# Patient Record
Sex: MALE | Race: WHITE | NOT HISPANIC OR LATINO | Employment: FULL TIME | RURAL
[De-identification: names, ages, dates, MRNs, and addresses within clinical notes are randomized per-mention and may not be internally consistent; named-entity substitution may affect disease eponyms.]

---

## 2021-10-12 ENCOUNTER — OFFICE VISIT (OUTPATIENT)
Dept: FAMILY MEDICINE CLINIC | Facility: CLINIC | Age: 61
End: 2021-10-12

## 2021-10-12 DIAGNOSIS — Z02.89 ENCOUNTER FOR PHYSICAL EXAMINATION RELATED TO EMPLOYMENT: ICD-10-CM

## 2021-10-12 PROCEDURE — 81003 URINALYSIS AUTO W/O SCOPE: CPT | Performed by: INTERNAL MEDICINE

## 2021-10-12 PROCEDURE — 99173 VISUAL ACUITY SCREEN: CPT | Performed by: INTERNAL MEDICINE

## 2021-10-12 PROCEDURE — DOTPHY: Performed by: INTERNAL MEDICINE

## 2021-10-12 NOTE — PROGRESS NOTES
Employment physical performed today. See scanned note.    I approved pt for duty with the agreement that he would follow up with his eye physician. He reports that he has already called them about an appointment and plans to go for that soon.

## 2022-03-28 ENCOUNTER — OFFICE VISIT (OUTPATIENT)
Dept: UROLOGY | Facility: CLINIC | Age: 62
End: 2022-03-28

## 2022-03-28 ENCOUNTER — HOSPITAL ENCOUNTER (OUTPATIENT)
Dept: RESPIRATORY THERAPY | Facility: HOSPITAL | Age: 62
Discharge: HOME | End: 2022-03-28
Admitting: SURGERY

## 2022-03-28 ENCOUNTER — TRANSCRIBE ORDERS (OUTPATIENT)
Dept: ADMINISTRATIVE | Facility: HOSPITAL | Age: 62
End: 2022-03-28

## 2022-03-28 ENCOUNTER — PATIENT ROUNDING (BHMG ONLY) (OUTPATIENT)
Dept: UROLOGY | Facility: CLINIC | Age: 62
End: 2022-03-28

## 2022-03-28 VITALS
HEIGHT: 66 IN | WEIGHT: 175 LBS | SYSTOLIC BLOOD PRESSURE: 126 MMHG | BODY MASS INDEX: 28.12 KG/M2 | DIASTOLIC BLOOD PRESSURE: 92 MMHG | OXYGEN SATURATION: 98 % | HEART RATE: 60 BPM | TEMPERATURE: 97.5 F

## 2022-03-28 DIAGNOSIS — R97.20 ELEVATED PROSTATE SPECIFIC ANTIGEN (PSA): Primary | ICD-10-CM

## 2022-03-28 DIAGNOSIS — I10 HTN (HYPERTENSION), BENIGN: ICD-10-CM

## 2022-03-28 DIAGNOSIS — I10 HTN (HYPERTENSION), BENIGN: Primary | ICD-10-CM

## 2022-03-28 LAB
BILIRUB BLD-MCNC: NEGATIVE MG/DL
CLARITY, POC: CLEAR
COLOR UR: YELLOW
EXPIRATION DATE: ABNORMAL
GLUCOSE UR STRIP-MCNC: NEGATIVE MG/DL
KETONES UR QL: NEGATIVE
LEUKOCYTE EST, POC: NEGATIVE
Lab: ABNORMAL
NITRITE UR-MCNC: NEGATIVE MG/ML
PH UR: 6 [PH] (ref 5–8)
PROT UR STRIP-MCNC: ABNORMAL MG/DL
QT INTERVAL: 404 MS
QTC INTERVAL: 406 MS
RBC # UR STRIP: ABNORMAL /UL
SP GR UR: 1.03 (ref 1–1.03)
UROBILINOGEN UR QL: NORMAL

## 2022-03-28 PROCEDURE — 93005 ELECTROCARDIOGRAM TRACING: CPT | Performed by: SURGERY

## 2022-03-28 PROCEDURE — 81003 URINALYSIS AUTO W/O SCOPE: CPT | Performed by: PHYSICIAN ASSISTANT

## 2022-03-28 PROCEDURE — 93010 ELECTROCARDIOGRAM REPORT: CPT | Performed by: INTERNAL MEDICINE

## 2022-03-28 PROCEDURE — 99203 OFFICE O/P NEW LOW 30 MIN: CPT | Performed by: PHYSICIAN ASSISTANT

## 2022-03-28 RX ORDER — METOPROLOL SUCCINATE 25 MG/1
25 TABLET, EXTENDED RELEASE ORAL DAILY
COMMUNITY
Start: 2022-03-16

## 2022-03-28 RX ORDER — ROSUVASTATIN CALCIUM 20 MG/1
20 TABLET, COATED ORAL DAILY
COMMUNITY
Start: 2022-02-17

## 2022-03-28 RX ORDER — CHOLECALCIFEROL (VITAMIN D3) 1250 MCG
50000 CAPSULE ORAL WEEKLY
COMMUNITY
Start: 2022-02-23

## 2022-03-28 NOTE — PROGRESS NOTES
"Chief Complaint  Elevated PSA    Referring Provider  Connie Reyes, TYEHSA ORELLANA  Mr. Montanez is a 61 y.o.  male who presents with an elevated PSA to 4.4. He has never had a prostate exam. He previously saw a Urologist for hematuria with a negative workup (35 years ago).     He has a FH of prostate CA in his father, was diagnosed at 64yo. He is unsure of what treatments were needed.       Patient complains of daytime frequency (in the setting of coffee drinking).  His IPSS score is 2.     Patient denies fevers, chills, nausea, vomiting, constipation, or flank pain.  Past  history is negative for BPH, frequent UTIs, stones or other renal diseases.     He denies shortness of breath, leg swelling, calf pain or bone pain.    Past Medical History  Past Medical History:   Diagnosis Date   • Heart attack (HCC) 03/2016       Past Surgical History  History reviewed. No pertinent surgical history.    Medications    Current Outpatient Medications:   •  Cholecalciferol (Vitamin D3) 1.25 MG (97490 UT) capsule, Take 50,000 Units by mouth 1 (One) Time Per Week., Disp: , Rfl:   •  metoprolol succinate XL (TOPROL-XL) 25 MG 24 hr tablet, Take 25 mg by mouth Daily., Disp: , Rfl:   •  rosuvastatin (CRESTOR) 20 MG tablet, Take 20 mg by mouth Daily., Disp: , Rfl:     Allergies  No Known Allergies      Family History  Father with prostate CA      Physical Exam  Visit Vitals  /92 (BP Location: Left arm, Patient Position: Sitting, Cuff Size: Adult)   Pulse 60   Temp 97.5 °F (36.4 °C)   Ht 167.6 cm (66\")   Wt 79.4 kg (175 lb)   SpO2 98%   BMI 28.25 kg/m²       Genitourinary  Rectal:  Normal tone, no masses.  Prostate:  Enlarged.  Symmetric, non-tender, anodular and no induration.      Labs  Brief Urine Lab Results  (Last result in the past 365 days)      Color   Clarity   Blood   Leuk Est   Nitrite   Protein   CREAT   Urine HCG        03/28/22 1501 Yellow   Clear   Trace   Negative   Negative   Trace           "         Assessment  Mr. Montanez is a 61 y.o.  male who presents with elevated PSA.  This is a new diagnosis of uncertain clinical prognosis.    I explained to the patient that an elevated PSA is a marker of risk of prostate cancer and a prostate biopsy would likely be the next step. We will obtain repeat PSA with free/total ratios and discuss results.    Plan  1. PSA total:free obtained today  2. FU in 2-4 weeks by phone to discuss results

## 2022-04-01 NOTE — PROGRESS NOTES
April 1, 2022    Hello, may I speak with Cheikh Montanez? Spoke with patient.    My name is Paulina MANJARREZ    I am  with [facility]  [address]  [facility] [address] [phone].    Before we get started may I verify your date of birth? 1960, correct.    I am calling to officially welcome you to our practice and ask about your recent visit. Is this a good time to talk? Yes.    Tell me about your visit with us. What things went well?  things went very well per patient.       We're always looking for ways to make our patients' experiences even better. Do you have recommendations on ways we may improve?  no.    Overall were you satisfied with your first visit to our practice? Yes.       I appreciate you taking the time to speak with me today. Is there anything else I can do for you? No.      Thank you, and have a great day.

## 2022-04-26 ENCOUNTER — OFFICE VISIT (OUTPATIENT)
Dept: CARDIOLOGY | Facility: CLINIC | Age: 62
End: 2022-04-26

## 2022-04-26 VITALS
WEIGHT: 179.8 LBS | OXYGEN SATURATION: 99 % | SYSTOLIC BLOOD PRESSURE: 128 MMHG | HEIGHT: 66 IN | BODY MASS INDEX: 28.9 KG/M2 | DIASTOLIC BLOOD PRESSURE: 72 MMHG | HEART RATE: 63 BPM

## 2022-04-26 DIAGNOSIS — R60.0 EDEMA OF RIGHT LOWER LEG DUE TO PERIPHERAL VENOUS INSUFFICIENCY: ICD-10-CM

## 2022-04-26 DIAGNOSIS — I83.891 SYMPTOMATIC VARICOSE VEINS, RIGHT: Primary | ICD-10-CM

## 2022-04-26 DIAGNOSIS — I87.2 EDEMA OF RIGHT LOWER LEG DUE TO PERIPHERAL VENOUS INSUFFICIENCY: ICD-10-CM

## 2022-04-26 PROCEDURE — 99204 OFFICE O/P NEW MOD 45 MIN: CPT | Performed by: INTERNAL MEDICINE

## 2022-04-26 RX ORDER — ASPIRIN 81 MG/1
81 TABLET ORAL DAILY
COMMUNITY

## 2022-04-26 NOTE — PROGRESS NOTES
[facility]  [address] [facility] [place] [address]  Phone: [phone]  Fax: [phone]    04/26/2022    Chief Complaint   Patient presents with   • Establish Care     Pt is here due to right leg swelling. It swells off and on. 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 Pt states that this has been going on for years        History:   Cheikh Montanez is a 61 y.o. male seen in consultation, referred by Dr.Ruth Magaly Reyes, APRN  For eval of right leg swelling. He claims swelling and bulging veins began roughly 4-5 years and mostly due to working long hours on concrete. Now the veins are bulging a lot and he feels pain and discomfort with leg swelling and change in color to brownish. He denies claudication type symptoms. His toes are generally warm.     Past Medical History:   Diagnosis Date   • Heart attack (HCC) 03/2016       Past Surgical History:   Procedure Laterality Date   • CARDIAC CATHETERIZATION     • HERNIA REPAIR          Review of Systems:  Please see HPI  Constitution: No chills, no rigors, no unexplained weight loss or weight gain  Eyes:  No diplopia, no blurred vision, no loss of vision, conjunctiva is pink and sclera is anicteric  ENT:  No tinnitus, no otorrhea, no epistaxis, no sore throat   Respiratory: No cough, no hemoptysis  Cardiovascular: see HPI  Gastrointestinal: No nausea, no vomiting, no hematemesis, no diarrhea or constipation, no melena  Genitourinary: No frequency of dysuria no hematuria  Integument: No pruritis and  no skin rash  Hematologic / Lymphatic: No excessive bleeding, easy bruising, fatigue, lymphadenopathy and petechiae  Musculoskeletal: No joint pain, joint stiffness, joint swelling, muscle pain, muscle weakness and neck pain  Neurological: No dizziness, headaches, light headedness, seizures and vertigo  Endocrine: No frequent urination and nocturia, temperature intolerance, weight gain,  unintended and weight loss, unintended        Past Social History:  Social History     Socioeconomic History   • Marital status:    Tobacco Use   • Smoking status: Former Smoker   • Smokeless tobacco: Former User   Vaping Use   • Vaping Use: Never used   Substance and Sexual Activity   • Alcohol use: Defer   • Drug use: Defer   • Sexual activity: Defer       Past Family History:  History reviewed. No pertinent family history.    Current Outpatient Medications   Medication Sig Dispense Refill   • aspirin 81 MG EC tablet Take 81 mg by mouth Daily.     • Cholecalciferol (Vitamin D3) 1.25 MG (87272 UT) capsule Take 50,000 Units by mouth 1 (One) Time Per Week.     • metoprolol succinate XL (TOPROL-XL) 25 MG 24 hr tablet Take 25 mg by mouth Daily.     • rosuvastatin (CRESTOR) 20 MG tablet Take 20 mg by mouth Daily.       No current facility-administered medications for this visit.        No Known Allergies    Objective:  Vitals:    04/26/22 1332   BP: 128/72   Pulse: 63   SpO2: 99%         Comfortable NAD  PERRL, conjunctiva clear  Neck supple, no JVD or thyromegaly appreciated  S1/S2 RRR, no m/r/g  Lungs CTA B, normal effort  Abdomen S/NT/ND (+) BS, no HSM appreciated  Extremities warm, no clubbing, cyanosis, or edema  Normal gait  No visible or palpable skin lesions  A/Ox4, mood and affect appropriate  Pulse exam:   Feet are warm bilateral  1+ edema bilateral  Capillary refill is normal  No evidence of ulceration or color change of the toes  PULSES  Right DP and PT are 0-1+ and Left DP and PT are 1+   Toes are warm and good capillary refill on right     DATA:           Procedures     A/P:  Advanced venous reflux disease with severe varicose veins of the right leg  Left leg is only mildly affected.  Hx of coronary stent x 2 by dr. Dejesus  Hx of recent Hernia repair right inguinal and guillermo-umbilical    Plan  Venous reflux study with times   He will need EVLT as a starting point but I also suspect that he has central  venous compression      BMI is >= 25 and < 30. (Overweight) The following options were offered after discussion: exercise counseling/recommendations       No diagnosis found.     Thank you for allowing me to participate in the care of Cheikh Montanez. Feel free to contact me directly with any further questions or concerns.        Director, Cardiac Cath Lab   no

## 2022-04-28 ENCOUNTER — PATIENT ROUNDING (BHMG ONLY) (OUTPATIENT)
Dept: CARDIOLOGY | Facility: CLINIC | Age: 62
End: 2022-04-28

## 2022-04-28 NOTE — PROGRESS NOTES
April 28, 2022    Hello, may I speak with Cheikh Montanez?    My name is Patt       I am  with [facility] [place]  [facility]  [address]  [place] [address] [phone].    Before we get started may I verify your date of birth? 1960    I am calling to officially welcome you to our practice and ask about your recent visit. Is this a good time to talk? yes    Tell me about your visit with us. What things went well?  Everything went good really liked the doctor.        We're always looking for ways to make our patients' experiences even better. Do you have recommendations on ways we may improve?   no    Overall were you satisfied with your first visit to our practice? yes       I appreciate you taking the time to speak with me today. Is there anything else I can do for you? no      Thank you, and have a great day.

## 2022-04-29 ENCOUNTER — OFFICE VISIT (OUTPATIENT)
Dept: UROLOGY | Facility: CLINIC | Age: 62
End: 2022-04-29

## 2022-04-29 DIAGNOSIS — R97.20 ELEVATED PROSTATE SPECIFIC ANTIGEN (PSA): Primary | ICD-10-CM

## 2022-04-29 PROCEDURE — 99441 PR PHYS/QHP TELEPHONE EVALUATION 5-10 MIN: CPT | Performed by: PHYSICIAN ASSISTANT

## 2022-04-29 RX ORDER — CIPROFLOXACIN 500 MG/1
500 TABLET, FILM COATED ORAL 2 TIMES DAILY
Qty: 6 TABLET | Refills: 0 | Status: SHIPPED | OUTPATIENT
Start: 2022-04-29 | End: 2022-08-26

## 2022-04-29 RX ORDER — CEPHALEXIN 500 MG/1
500 CAPSULE ORAL 2 TIMES DAILY
Qty: 6 CAPSULE | Refills: 0 | Status: SHIPPED | OUTPATIENT
Start: 2022-04-29 | End: 2022-05-02

## 2022-04-29 RX ORDER — MAGNESIUM HYDROXIDE 1200 MG/15ML
1 LIQUID ORAL ONCE
Qty: 1 ENEMA | Refills: 0 | Status: SHIPPED | OUTPATIENT
Start: 2022-04-29 | End: 2022-04-29

## 2022-05-25 ENCOUNTER — HOSPITAL ENCOUNTER (OUTPATIENT)
Dept: CARDIOLOGY | Facility: HOSPITAL | Age: 62
Discharge: HOME | End: 2022-05-25
Admitting: INTERNAL MEDICINE

## 2022-05-25 DIAGNOSIS — R60.0 EDEMA OF RIGHT LOWER LEG DUE TO PERIPHERAL VENOUS INSUFFICIENCY: ICD-10-CM

## 2022-05-25 DIAGNOSIS — I87.2 EDEMA OF RIGHT LOWER LEG DUE TO PERIPHERAL VENOUS INSUFFICIENCY: ICD-10-CM

## 2022-05-25 PROCEDURE — 93970 EXTREMITY STUDY: CPT | Performed by: RADIOLOGY

## 2022-05-25 PROCEDURE — 93970 EXTREMITY STUDY: CPT

## 2022-06-07 ENCOUNTER — TELEPHONE (OUTPATIENT)
Dept: CARDIOLOGY | Facility: CLINIC | Age: 62
End: 2022-06-07

## 2022-06-07 NOTE — TELEPHONE ENCOUNTER
----- Message from Candido Erickson MD sent at 6/6/2022  5:42 PM EDT -----  Please call the patient regarding abnormal result. Schedule OV

## 2022-06-09 ENCOUNTER — OFFICE VISIT (OUTPATIENT)
Dept: CARDIOLOGY | Facility: CLINIC | Age: 62
End: 2022-06-09

## 2022-06-09 VITALS
HEIGHT: 68 IN | DIASTOLIC BLOOD PRESSURE: 80 MMHG | BODY MASS INDEX: 26.83 KG/M2 | HEART RATE: 62 BPM | WEIGHT: 177 LBS | SYSTOLIC BLOOD PRESSURE: 116 MMHG | OXYGEN SATURATION: 98 %

## 2022-06-09 DIAGNOSIS — I82.812 ACUTE SUPERFICIAL VENOUS THROMBOSIS OF LOWER EXTREMITY, LEFT: Primary | ICD-10-CM

## 2022-06-09 PROCEDURE — 99214 OFFICE O/P EST MOD 30 MIN: CPT | Performed by: INTERNAL MEDICINE

## 2022-06-29 ENCOUNTER — TELEPHONE (OUTPATIENT)
Dept: UROLOGY | Facility: CLINIC | Age: 62
End: 2022-06-29

## 2022-06-29 NOTE — TELEPHONE ENCOUNTER
Caller: BEBO CABRERA    Relationship to patient: WIFE    Best call back number: [phone]    Patient is needing: WIFE STATES THEY PICKED UP PRESCRIPTIONS FOR PROSTATE BIOPSY FOR 6/30/22 AND ONLY RECEIVED 2 ANTIBIOTICS. STATES HE WAS ALSO SUPPOSED TO RECEIVE FLEET ENEMA. SHE NEEDS TO KNOW IF PRESCRIPTION IS REQUIRED OR SHOULD THEY PURCHASE SOMETHING OVER THE COUNTER. IF PRESCRIPTION NOT NEEDED, THEY NEED TO KNOW INSTRUCTIONS FOR WHEN AND HOW TO USE.

## 2022-06-29 NOTE — TELEPHONE ENCOUNTER
I called patient and explained to purchase an OTC fleet enema and administer at least two hours prior to appt time. Pt is not taking blood thinners, only baby aspirin, and to start prophylactics as the bottle states starting today.

## 2022-06-30 ENCOUNTER — PROCEDURE VISIT (OUTPATIENT)
Dept: UROLOGY | Facility: CLINIC | Age: 62
End: 2022-06-30

## 2022-06-30 DIAGNOSIS — R97.20 ELEVATED PROSTATE SPECIFIC ANTIGEN (PSA): Primary | ICD-10-CM

## 2022-06-30 PROCEDURE — 76942 ECHO GUIDE FOR BIOPSY: CPT | Performed by: UROLOGY

## 2022-06-30 PROCEDURE — 55700 PR PROSTATE NEEDLE BIOPSY ANY APPROACH: CPT | Performed by: UROLOGY

## 2022-07-07 ENCOUNTER — OFFICE VISIT (OUTPATIENT)
Dept: UROLOGY | Facility: CLINIC | Age: 62
End: 2022-07-07

## 2022-07-07 DIAGNOSIS — C61 PROSTATE CANCER: Primary | ICD-10-CM

## 2022-07-07 PROCEDURE — 99443 PR PHYS/QHP TELEPHONE EVALUATION 21-30 MIN: CPT | Performed by: UROLOGY

## 2022-07-07 NOTE — PROGRESS NOTES
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   We discussed different options at length including active surveillance versus treatment.  We will send the tissue for molecular testing with Oncotype prior to making decision.    Follow-up in 3 to 4 weeks to discuss Oncotype results.    You have chosen to receive care through a telephone visit. Do you consent to use a telephone visit for your medical care today? Yes

## 2022-08-01 ENCOUNTER — TELEPHONE (OUTPATIENT)
Dept: UROLOGY | Facility: CLINIC | Age: 62
End: 2022-08-01

## 2022-08-01 NOTE — TELEPHONE ENCOUNTER
"Caller: BEBO CABRERA    Relationship to patient: SPOUSE    Best call back number: [phone]    Patient is needing: PT IS AT WORK, WIFE CALLED TO CHECK ON BIOPSY RESULTS, STATES WAS TOLD 2-3 WKS. PER LAST NOTE W/DR KWAN ON 7/7/22 \"Follow-up in 3 to 4 weeks to discuss Oncotype results\"  "

## 2022-08-02 LAB — REF LAB TEST METHOD: NORMAL

## 2022-08-02 NOTE — PROGRESS NOTES
"Chief Complaint   Patient presents with   • Elevated PSA     Follow up oncotype test        HPI  Ms. Montanez is a 61 y.o. male with history below in assessment, who presents for follow up.     At this visit patient's oncotype shows low risk, approx 24% chance of adverse pathology.    Denies LUTS. Good erections.     Past Medical History:   Diagnosis Date   • Heart attack (HCC) 03/2016       Past Surgical History:   Procedure Laterality Date   • CARDIAC CATHETERIZATION     • HERNIA REPAIR           Current Outpatient Medications:   •  aspirin 81 MG EC tablet, Take 81 mg by mouth Daily., Disp: , Rfl:   •  Cholecalciferol (Vitamin D3) 1.25 MG (71238 UT) capsule, Take 50,000 Units by mouth 1 (One) Time Per Week., Disp: , Rfl:   •  ciprofloxacin (Cipro) 500 MG tablet, Take 1 tablet by mouth 2 (Two) Times a Day. Start taking the day prior to biopsy, Disp: 6 tablet, Rfl: 0  •  metoprolol succinate XL (TOPROL-XL) 25 MG 24 hr tablet, Take 25 mg by mouth Daily., Disp: , Rfl:   •  rosuvastatin (CRESTOR) 20 MG tablet, Take 20 mg by mouth Daily., Disp: , Rfl:      Physical Exam  Visit Vitals  /70   Pulse 78   Temp 97.8 °F (36.6 °C)   Resp 17   Ht 172.7 cm (67.99\")   Wt 80.3 kg (177 lb)   SpO2 99%   BMI 26.92 kg/m²       Labs  Brief Urine Lab Results  (Last result in the past 365 days)      Color   Clarity   Blood   Leuk Est   Nitrite   Protein   CREAT   Urine HCG        03/28/22 1501 Yellow   Clear   Trace   Negative   Negative   Trace               No results found for: GLUCOSE, CALCIUM, NA, K, CO2, CL, BUN, CREATININE, EGFRIFAFRI, EGFRIFNONA, BCR, ANIONGAP    No results found for: WBC, HGB, HCT, MCV, PLT       Lab Results   Component Value Date    PSA 4.3 (H) 03/29/2022           Radiographic Studies  US Venous Doppler Lower Extremity Bilat Duplex for Insufficiency  Result Date: 5/26/2022  No sonographic findings of deep venous thrombosis. There is evidence of chronic thrombus in the saphenous vein of the right leg. The " left saphenous vein was not dilated and showed no evidence of incompetent valves.  This report was finalized on 5/26/2022 7:59 AM by Dr. Shalom Jackson II, MD.        I have reviewed above labs and imaging.     Assessment  61 y.o. 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 <1% chance of metastatic disease or death in 10 years.     We had a long discussion about AS vs Tx for his fairly low risk disease. We discussed risks, benefits and alternatives of each option: Active surveillance, robotic prostatectomy, EBRT. He still wants to think more and discuss with his wife.     Plan  1. Fu in 3 weeks. I recommended Active Surveillance    I spent a total of 35 minutes with the patient and the chart engaging in data gathering and interpretation, patient interaction, as well as counseling on the risks, benefits, and alternatives of the therapy and coordinating care.

## 2022-08-04 ENCOUNTER — OFFICE VISIT (OUTPATIENT)
Dept: UROLOGY | Facility: CLINIC | Age: 62
End: 2022-08-04

## 2022-08-04 VITALS
TEMPERATURE: 97.8 F | BODY MASS INDEX: 26.83 KG/M2 | HEIGHT: 68 IN | OXYGEN SATURATION: 99 % | DIASTOLIC BLOOD PRESSURE: 70 MMHG | HEART RATE: 78 BPM | WEIGHT: 177 LBS | SYSTOLIC BLOOD PRESSURE: 122 MMHG | RESPIRATION RATE: 17 BRPM

## 2022-08-04 DIAGNOSIS — C61 PROSTATE CANCER: Primary | ICD-10-CM

## 2022-08-04 PROCEDURE — 99214 OFFICE O/P EST MOD 30 MIN: CPT | Performed by: UROLOGY

## 2022-08-26 ENCOUNTER — OFFICE VISIT (OUTPATIENT)
Dept: UROLOGY | Facility: CLINIC | Age: 62
End: 2022-08-26

## 2022-08-26 DIAGNOSIS — C61 PROSTATE CANCER: Primary | ICD-10-CM

## 2022-08-26 PROCEDURE — 99442 PR PHYS/QHP TELEPHONE EVALUATION 11-20 MIN: CPT | Performed by: UROLOGY

## 2022-08-26 RX ORDER — CEPHALEXIN 500 MG/1
500 CAPSULE ORAL 2 TIMES DAILY
Qty: 6 CAPSULE | Refills: 0 | Status: SHIPPED | OUTPATIENT
Start: 2022-08-26 | End: 2022-08-29

## 2022-08-26 RX ORDER — CIPROFLOXACIN 500 MG/1
500 TABLET, FILM COATED ORAL 2 TIMES DAILY
Qty: 6 TABLET | Refills: 0 | Status: SHIPPED | OUTPATIENT
Start: 2022-08-26 | End: 2023-02-21

## 2022-08-26 RX ORDER — MAGNESIUM HYDROXIDE 1200 MG/15ML
1 LIQUID ORAL ONCE
Qty: 1 ENEMA | Refills: 0 | Status: SHIPPED | OUTPATIENT
Start: 2022-08-26 | End: 2022-08-26

## 2022-08-26 NOTE — PROGRESS NOTES
15-minute telephone conversation with patient    After deliberation, we decided on active surveillance.  His main concern was that if he were to lose health insurance in the next year or 2, would he be in a situation where he would be looking at high cost.  I told him that it would be very difficult to predict that, but if we did do active surveillance, which is what I recommend, we would be looking at an MRI and repeat biopsy in 6 months.  I told him that at any point we can switch over to treatment if he so desires.     You have chosen to receive care through a telephone visit. Do you consent to use a telephone visit for your medical care today? Yes

## 2023-02-15 ENCOUNTER — HOSPITAL ENCOUNTER (OUTPATIENT)
Dept: MRI IMAGING | Facility: HOSPITAL | Age: 63
Discharge: HOME | End: 2023-02-15
Admitting: UROLOGY
Payer: COMMERCIAL

## 2023-02-15 DIAGNOSIS — C61 PROSTATE CANCER: ICD-10-CM

## 2023-02-15 PROCEDURE — A9577 INJ MULTIHANCE: HCPCS | Performed by: UROLOGY

## 2023-02-15 PROCEDURE — 0 GADOBENATE DIMEGLUMINE 529 MG/ML SOLUTION: Performed by: UROLOGY

## 2023-02-15 PROCEDURE — 72197 MRI PELVIS W/O & W/DYE: CPT

## 2023-02-15 PROCEDURE — 82565 ASSAY OF CREATININE: CPT

## 2023-02-15 RX ADMIN — GADOBENATE DIMEGLUMINE 16 ML: 529 INJECTION, SOLUTION INTRAVENOUS at 09:21

## 2023-02-20 ENCOUNTER — TELEPHONE (OUTPATIENT)
Dept: UROLOGY | Facility: CLINIC | Age: 63
End: 2023-02-20
Payer: COMMERCIAL

## 2023-02-20 NOTE — TELEPHONE ENCOUNTER
Patient is scheduled for prostate bx on 2/27. He needs antibiotics sent in for him to start prior. Patient has enema. Patient was also reminded of blood thinner policy. Please advise when medication is sent in so I can contact pt.

## 2023-02-21 DIAGNOSIS — C61 PROSTATE CANCER: Primary | ICD-10-CM

## 2023-02-21 RX ORDER — CIPROFLOXACIN 500 MG/1
500 TABLET, FILM COATED ORAL 2 TIMES DAILY
Qty: 6 TABLET | Refills: 0 | Status: SHIPPED | OUTPATIENT
Start: 2023-02-21 | End: 2023-03-10

## 2023-02-21 RX ORDER — CEPHALEXIN 500 MG/1
500 CAPSULE ORAL 2 TIMES DAILY
Qty: 6 CAPSULE | Refills: 0 | Status: SHIPPED | OUTPATIENT
Start: 2023-02-21 | End: 2023-02-24

## 2023-02-22 ENCOUNTER — TELEPHONE (OUTPATIENT)
Dept: UROLOGY | Facility: CLINIC | Age: 63
End: 2023-02-22

## 2023-02-22 NOTE — TELEPHONE ENCOUNTER
"UNABLE TO REACH OFFICE,     Caller: CORBY CABRERA     Relationship: SELF     Best call back number: [phone]    PT RETURNING CALL TO [facility],     [facility] CANNOT DISCLOSE REASON FOR MISSED CALL IF ENCOUNTER DOES NOT STATE \"HUB TO READ\"    ATTEMPTED TO CONNECT PT WITH OFFICE TO RETURN CALL, UNSUCCESSFUL.  "

## 2023-03-01 LAB — CREAT BLDA-MCNC: 1.3 MG/DL (ref 0.6–1.3)

## 2023-03-06 ENCOUNTER — TELEPHONE (OUTPATIENT)
Dept: UROLOGY | Facility: CLINIC | Age: 63
End: 2023-03-06

## 2023-03-06 NOTE — TELEPHONE ENCOUNTER
Caller: BEBO CABRERA    Relationship: Emergency Contact    Best call back number:[phone]    What is the best time to reach you: ANY    Who are you requesting to speak with (clinical staff, provider,  specific staff member): MATTHEW TOLBERT    Do you know the name of the person who called: BEBO CABRERA    What was the call regarding: PT IS LOOKING TO RESCHEDULE BIOPSY    Do you require a callback: YES

## 2023-03-09 ENCOUNTER — TELEPHONE (OUTPATIENT)
Dept: UROLOGY | Facility: CLINIC | Age: 63
End: 2023-03-09

## 2023-03-09 NOTE — TELEPHONE ENCOUNTER
UNABLE TO REACH THE .     Caller: BEBO     Relationship: SPOUSE     Best call back number: [phone]    PT'S WIFE CALLING, SAYS PT WAS SUPPOSED TO BE THE FIRST ONE REACHED OUT TO WHEN YOU ALL RECEIVED BIOPSY SUPPLIES. SAYS THEY HAVEN'T HEARD ANYTHING IN A WEEK. WANTING AN UPDATE. COULD NOT REACH .

## 2023-03-10 ENCOUNTER — TELEPHONE (OUTPATIENT)
Dept: UROLOGY | Facility: CLINIC | Age: 63
End: 2023-03-10
Payer: COMMERCIAL

## 2023-03-10 DIAGNOSIS — C61 PROSTATE CANCER: Primary | ICD-10-CM

## 2023-03-10 RX ORDER — CIPROFLOXACIN 500 MG/1
500 TABLET, FILM COATED ORAL 2 TIMES DAILY
Qty: 6 TABLET | Refills: 0 | Status: SHIPPED | OUTPATIENT
Start: 2023-03-10

## 2023-03-10 RX ORDER — CEPHALEXIN 500 MG/1
500 CAPSULE ORAL 2 TIMES DAILY
Qty: 6 CAPSULE | Refills: 0 | Status: SHIPPED | OUTPATIENT
Start: 2023-03-10 | End: 2023-03-13

## 2023-03-10 RX ORDER — MAGNESIUM HYDROXIDE 1200 MG/15ML
1 LIQUID ORAL ONCE
Qty: 1 ENEMA | Refills: 0 | Status: SHIPPED | OUTPATIENT
Start: 2023-03-10 | End: 2023-03-10

## 2023-03-10 NOTE — TELEPHONE ENCOUNTER
Patient needs antibiotics sent in, he took the others and his BX had to be rescheduled due to the needles.

## 2023-03-20 ENCOUNTER — PROCEDURE VISIT (OUTPATIENT)
Dept: UROLOGY | Facility: CLINIC | Age: 63
End: 2023-03-20
Payer: COMMERCIAL

## 2023-03-20 DIAGNOSIS — C61 PROSTATE CANCER: Primary | ICD-10-CM

## 2023-03-20 PROCEDURE — 76942 ECHO GUIDE FOR BIOPSY: CPT | Performed by: UROLOGY

## 2023-03-20 PROCEDURE — 55700 PR PROSTATE NEEDLE BIOPSY ANY APPROACH: CPT | Performed by: UROLOGY

## 2023-03-20 NOTE — PROGRESS NOTES
TRUS BIOPSY OF PROSTATE    Preoperative diagnosis  Elevated PSA    Postoperative diagnosis  Elevated PSA    Procedure  1.  Transrectal ultrasound of the prostate  2.  Transrectal ultrasound guidance of needle biopsy  3.  Prostate biopsy    Attending Surgeon  Ankush Nye MD    Anesthesia  2% lidocaine jelly, intrarectal instillation, 10mL  1% lidocaine solution, periprostatic injection, 10mL    Complications  None    Specimen  Prostate biopsy x 16    Indications  Mr. Montanez is a 62 y.o. year old male with prostate cancer on active surveillance.  Lab Results   Component Value Date    PSA 4.3 (H) 03/29/2022       Radiographic Studies  MRI Prostate With & Without Contrast  Result Date: 2/16/2023  Impression: 1. Left  anteromedial [id number]-[id number] transition zone lesion, corresponding to an assessment category of PI-RADS 5 - Very high (clinically significant cancer is highly likely to be present). 2. Negative for pelvic lymphadenopathy. 3. No suspicious osseous lesion in the pelvis. 4. No clear evidence of EPE Overall PI-RADS 5 exam. Electronically Signed: Chuy Arevalor  2/16/2023 11:49 PM EST  Workstation ID: [id number]    After discussing his options, the patient decided to proceed with prostate biopsy.  Informed consent was obtained. Possible complications were discussed with the patient during his last visit including, but not limited to, hematuria, hematochezia, prostatitis, urinary tract infection, sepsis, and urinary retention.  He did start the prescribed oral antibiotic regimen.    Procedure  The patient was positioned and prepped in a left lateral position with lower extremities flexed.  Lidocaine jelly, 2%, was injected per rectum. A digital rectal exam was performed.The [employer] E8CS rectal ultrasound probe was slowly introduced into the rectum without difficulty.  The prostate and seminal vesicles were inspected systematically using cross and sagittal views with the ultrasound.  The dimensions of the prostate were  measured, for a calculated volume of 25 mL.  Using a true cut 14 Fr biopsy needle, 16 prostate cores were collected. The specific locations were the following: left lateral base, left lateral mid, left lateral apex, left medial base, left medial mid, and left medial apex, right lateral base, right lateral mid, right lateral apex, right medial base, right medial mid, right medial apex, and right and left transition zones. The rectal ultrasound probe was removed.  A RONNA was again performed revealing little blood in the rectum.  The patient tolerated the procedure well.    Plan  1.  The patient was instructed to drink plenty of fluids and warned about possible complications and side effects including, but not limited to, blood in the urine, stool and semen as well as bloodstream infection.  He was instructed to call the office if there are any issues, especially fevers or flu-like symptoms.    2.  Continue antibiotic for a total of 3 days.  3.  The patient will return to clinic in approximately 1 week for discussion of the pathological report.

## 2023-03-23 LAB — REF LAB TEST METHOD: NORMAL

## 2023-03-27 ENCOUNTER — LAB (OUTPATIENT)
Dept: LAB | Facility: HOSPITAL | Age: 63
End: 2023-03-27
Payer: COMMERCIAL

## 2023-03-27 ENCOUNTER — OFFICE VISIT (OUTPATIENT)
Dept: UROLOGY | Facility: CLINIC | Age: 63
End: 2023-03-27
Payer: COMMERCIAL

## 2023-03-27 VITALS
WEIGHT: 177 LBS | HEIGHT: 68 IN | HEART RATE: 66 BPM | DIASTOLIC BLOOD PRESSURE: 98 MMHG | SYSTOLIC BLOOD PRESSURE: 138 MMHG | BODY MASS INDEX: 26.83 KG/M2 | OXYGEN SATURATION: 95 % | TEMPERATURE: 98.5 F

## 2023-03-27 DIAGNOSIS — C61 PROSTATE CANCER: ICD-10-CM

## 2023-03-27 DIAGNOSIS — C61 PROSTATE CANCER: Primary | ICD-10-CM

## 2023-03-27 LAB
ANION GAP SERPL CALCULATED.3IONS-SCNC: 5 MMOL/L (ref 5–15)
BUN SERPL-MCNC: 10 MG/DL (ref 8–23)
BUN/CREAT SERPL: 9.6 (ref 7–25)
CALCIUM SPEC-SCNC: 9.3 MG/DL (ref 8.6–10.5)
CHLORIDE SERPL-SCNC: 106 MMOL/L (ref 98–107)
CO2 SERPL-SCNC: 28 MMOL/L (ref 22–29)
CREAT SERPL-MCNC: 1.04 MG/DL (ref 0.76–1.27)
EGFRCR SERPLBLD CKD-EPI 2021: 81.7 ML/MIN/1.73
GLUCOSE SERPL-MCNC: 115 MG/DL (ref 65–99)
POTASSIUM SERPL-SCNC: 4.9 MMOL/L (ref 3.5–5.2)
PSA SERPL-MCNC: 18.1 NG/ML (ref 0–4)
SODIUM SERPL-SCNC: 139 MMOL/L (ref 136–145)

## 2023-03-27 PROCEDURE — 84153 ASSAY OF PSA TOTAL: CPT

## 2023-03-27 PROCEDURE — 36415 COLL VENOUS BLD VENIPUNCTURE: CPT

## 2023-03-27 PROCEDURE — 99215 OFFICE O/P EST HI 40 MIN: CPT | Performed by: UROLOGY

## 2023-03-27 PROCEDURE — 80048 BASIC METABOLIC PNL TOTAL CA: CPT

## 2023-03-27 RX ORDER — NITROGLYCERIN 0.4 MG/1
0.4 TABLET SUBLINGUAL
COMMUNITY
Start: 2023-03-21

## 2023-03-27 NOTE — PROGRESS NOTES
"Chief Complaint   Patient presents with   • Follow-up     Prostate BX          HPI  Ms. Montanez is a 62 y.o. male with history below in assessment, who presents for follow up.     At this visit patient here to discuss pathology after repeat biopsy on active surveillance.    Past Medical History:   Diagnosis Date   • Heart attack (HCC) 03/2016       Past Surgical History:   Procedure Laterality Date   • CARDIAC CATHETERIZATION     • HERNIA REPAIR           Current Outpatient Medications:   •  aspirin 81 MG EC tablet, Take 1 tablet by mouth Daily., Disp: , Rfl:   •  Cholecalciferol (Vitamin D3) 1.25 MG (13920 UT) capsule, Take 1 capsule by mouth 1 (One) Time Per Week., Disp: , Rfl:   •  ciprofloxacin (Cipro) 500 MG tablet, Take 1 tablet by mouth 2 (Two) Times a Day. Start taking the day prior to biopsy, Disp: 6 tablet, Rfl: 0  •  metoprolol succinate XL (TOPROL-XL) 25 MG 24 hr tablet, Take 1 tablet by mouth Daily., Disp: , Rfl:   •  nitroglycerin (NITROSTAT) 0.4 MG SL tablet, Place 1 tablet under the tongue., Disp: , Rfl:   •  rosuvastatin (CRESTOR) 20 MG tablet, Take 1 tablet by mouth Daily., Disp: , Rfl:      Physical Exam  Visit Vitals  /98   Pulse 66   Temp 98.5 °F (36.9 °C)   Ht 172.7 cm (67.99\")   Wt 80.3 kg (177 lb)   SpO2 95%   BMI 26.92 kg/m²       Labs  Brief Urine Lab Results  (Last result in the past 365 days)      Color   Clarity   Blood   Leuk Est   Nitrite   Protein   CREAT   Urine HCG        03/28/22 1501 Yellow   Clear   Trace   Negative   Negative   Trace                 Lab Results   Component Value Date    CREATININE 1.30 02/15/2023       No results found for: WBC, HGB, HCT, MCV, PLT         Lab Results   Component Value Date    PSA 4.3 (H) 03/29/2022             Radiographic Studies  MRI Prostate With & Without Contrast  Result Date: 2/16/2023  Impression: 1. Left  anteromedial qmdwygnb-om-ackv transition zone lesion, corresponding to an assessment category of PI-RADS 5 - Very high " (clinically significant cancer is highly likely to be present). 2. Negative for pelvic lymphadenopathy. 3. No suspicious osseous lesion in the pelvis. 4. No clear evidence of EPE Overall PI-RADS 5 exam. Electronically Signed: Chuy Arevalor  2/16/2023 11:49 PM EST  Workstation ID: [id number]    Pathology  DATE COLLECTED      DATE RECEIVED      DATE REPORTED   03/21/2023 03/21/2023 03/23/2023   DIAGNOSIS:   A.   PROSTATE, NEEDLE CORE BIOPSY, RIGHT APEX:   Focal adenocarcinoma, Logan score 3+3 = 6 (grade Group 1)   1 of 2 cores involved   Area of carcinoma 1.5 mm in greatest dimension   B.   PROSTATE, NEEDLE CORE BIOPSY, RIGHT MID:   Tiny focus of adenocarcinoma, too small to grade   1 of 2 cores involved   Area of neoplasm less than 1 mm   C.   PROSTATE, NEEDLE CORE BIOPSY, RIGHT BASE:   Adenocarcinoma, [place] score 3+3 = 6 (grade group 1)   1 of 2 cores involved   Area of carcinoma 2.5 mm in greatest dimension   D.   PROSTATE, NEEDLE CORE BIOPSY, LEFT APEX:   Stromal and glandular hyperplasia; negative for malignancy   E.   PROSTATE, NEEDLE CORE BIOPSY, LEFT MID:   Stromal and glandular hyperplasia; negative for malignancy   F.   PROSTATE, NEEDLE CORE BIOPSY, LEFT BASE:   Adenocarcinoma, [place] score 3+3 = 6 (grade group 1)   2 out of 2 cores involved   Carcinoma spans an area of 8 mm in greatest dimension   G.   PROSTATE, NEEDLE CORE BIOPSY, RIGHT TRANSZONE:   Chronic prostatitis; negative for evidence of malignancy   H.   PROSTATE, NEEDLE CORE BIOPSY, LEFT TRANSZONE:   Adenocarcinoma, [place] score 3+4 = 7 (grade group 2)   Percentage of pattern 4 is 20% in these cores   2 of 3 cores involved   Area of carcinoma 6 mm in greatest dimension   Focal high-grade PIN present        I have reviewed above labs and imaging.     Assessment  62 y.o. male with previously low risk prostate cancer, on active surveillance.  Based on recent biopsy he now has intermediate risk disease.  I have recommended  treatment.    We had a long discussion about different treatment options with the risks and benefits.  We entered into his information into a prostate cancer nomogram from [facility].  With treatment, he has an 85% chance of BCR free survival at 5 years and 75% chance at 10 years.  His chance of having lymph node involvement is 3%, and chance of organ confined disease i.e. no extracapsular extension is 64%.    He is young, fairly healthy, though he has had cardiac stents for CAD in the past.  He is thin with no major abdominal operations, just to hernia surgery in the past.  I therefore think either option of surgery or radiation would be very reasonable, though I would favor surgery slightly for him.  His father had radiation therapy for prostate cancer and did very well with it.  He  of lung cancer.    Plan  1.  Referral to radiation oncology and robotic surgery.  I did not order any adjunctive imaging, due to his very low risk of lymph node involvement, per NCCN guidelines, it is not indicated.  2.  Follow-up with me in 4 weeks to discuss treatment decision.  If he wants radiation I will place markers and spacer for him.   3.  Updated PSA and BMP    I spent a total of 45 minutes with the patient and the chart engaging in data gathering and interpretation, patient interaction, as well as counseling on the risks, benefits, and alternatives of the therapy and coordinating care.

## 2023-04-11 ENCOUNTER — TELEPHONE (OUTPATIENT)
Dept: RADIATION ONCOLOGY | Facility: HOSPITAL | Age: 63
End: 2023-04-11
Payer: COMMERCIAL

## 2023-04-11 NOTE — TELEPHONE ENCOUNTER
Confirmed patient's appointment for Thursday, April 13, 2023 at 1:00 pm with Dr Domínguez.  He's not sure if he received his new patient packet as his wife usually takes care of that and he's currently at work.  He will ask her and if not, will arrive 30 minutes early to fill it out.  He's had a prostate biopsy and also an MRI performed at .

## 2023-04-13 ENCOUNTER — OFFICE VISIT (OUTPATIENT)
Dept: RADIATION ONCOLOGY | Facility: HOSPITAL | Age: 63
End: 2023-04-13
Payer: COMMERCIAL

## 2023-04-13 ENCOUNTER — HOSPITAL ENCOUNTER (OUTPATIENT)
Dept: RADIATION ONCOLOGY | Facility: HOSPITAL | Age: 63
Setting detail: RADIATION/ONCOLOGY SERIES
Discharge: HOME | End: 2023-04-13
Payer: COMMERCIAL

## 2023-04-13 VITALS
HEART RATE: 63 BPM | BODY MASS INDEX: 28.11 KG/M2 | HEIGHT: 67 IN | SYSTOLIC BLOOD PRESSURE: 132 MMHG | TEMPERATURE: 97 F | OXYGEN SATURATION: 97 % | WEIGHT: 179.1 LBS | DIASTOLIC BLOOD PRESSURE: 85 MMHG | RESPIRATION RATE: 18 BRPM

## 2023-04-13 DIAGNOSIS — C61 PROSTATE CANCER: ICD-10-CM

## 2023-04-13 PROCEDURE — G0463 HOSPITAL OUTPT CLINIC VISIT: HCPCS | Performed by: RADIOLOGY

## 2023-04-13 NOTE — PROGRESS NOTES
RADIATION ONCOLOGY PROGRESS NOTE  04/13/23    Vitals:    04/13/23 1317   BP: 132/85   Pulse: 63   Resp: 18   Temp: 97 °F (36.1 °C)   SpO2: 97%     Pt has 2 cardiac stents placed in 2015 at [facility]-denies stents or metal anywhere else-OK for 3 T per Jairo/MRI

## 2023-04-13 NOTE — PROGRESS NOTES
CONSULTATION NOTE      :                                                          1960  DATE OF CONSULTATION:                       2023   REQUESTING PHYSICIAN:                   Ankush Nye MD  REASON FOR CONSULTATION:           Prostate Cancer   Cancer Staging   Stage IIB (cT1c, cN0, cM0, PSA: 18.1, Grade Group: 2)    Thank you for requesting my services in evaluation of this pleasant individual.  I am seeing them in outpatient consultation regarding a diagnosis of prostate cancer.     BRIEF HISTORY:  The patient is a very pleasant 62 y.o. male  with a past medical history significant for coronary artery disease with a prior acute coronary event in  treated with PCI, as well as hypertension and peripheral vascular disease, who was diagnosed last year with a favorable, low risk prostate cancer.  He had a PSA of 4.4 ng/ml and on transrectal biopsy, a Ehrenberg 3+3=6 adenocarcinoma in 5 cores, all with relatively low volume.  His Oncotype-Dx GPS score was 24, corresponding to low risk disease.  He elected for active observation, but when he returned for follow-up visit this spring, his PSA was found to have elevated to 18.1 NG/mL.  A PI-RADS MRI was obtained confirming a PI-RADS 5 lesion in the left transition zone and apex.  He underwent a repeat transrectal ultrasound-guided biopsy of the prostate that this time revealed persistent Ehrenberg 3+3 = 6 disease, but now he has Logan 3+4 = 7 disease in the left transition zone.  He is therefore being referred for consideration of definitive treatment.  From a symptomatic standpoint, he reports minimal symptoms.  His IPSS score today is 9, significant for urgency and straining.  He reports a high level of erectile function.  He denies any gastrointestinal symptoms.  He has never underwent a colonoscopy.    Allergy: No Known Allergies    Social History:   Social History     Socioeconomic History   • Marital status:    Tobacco Use   •  Smoking status: Former   • Smokeless tobacco: Former   • Tobacco comments:     Quit 2015   Vaping Use   • Vaping Use: Never used   Substance and Sexual Activity   • Alcohol use: Not Currently   • Drug use: Never   • Sexual activity: Defer       Past Medical History:   Past Medical History:   Diagnosis Date   • Coronary artery disease     [facility]   • Heart attack 03/2016   • Hypertension    • Prostate cancer    • PVD (peripheral vascular disease)        Family History: family history includes Breast cancer in his mother; Lung cancer in his father and mother; Prostate cancer in his father; Stomach cancer in his niece.     Surgical History:   Past Surgical History:   Procedure Laterality Date   • CARDIAC CATHETERIZATION     • CORONARY ANGIOPLASTY WITH STENT PLACEMENT      X 2 2015   • HERNIA REPAIR          Review of Systems:   Review of Systems   All other systems reviewed and are negative.       IPSS Questionnaire (AUA-7):  Over the past month…    1)  Incomplete Emptying  How often have you had a sensation of not emptying your bladder?  0 - Not at all   2)  Frequency  How often have you had to urinate less than every two hours? 1 - Less than 1 time in 5   3)  Intermittency  How often have you found you stopped and started again several times when you urinated?  2 - Less than half the time   4) Urgency  How often have you found it difficult to postpone urination?  3 - About half the time   5) Weak Stream  How often have you had a weak urinary stream?  0 - Not at all   6) Straining  How often have you had to push or strain to begin urination?  3 - About half the time   7) Nocturia  How many times did you typically get up at night to urinate?  0 - None   Total Score:  9       Quality of life due to urinary symptoms:  If you were to spend the rest of your life with your urinary condition the way it is now, how would you feel about that? 0-Delighted   Urine Leakage (Incontinence)  "0-No Leakage     Sexual Health Inventory  Current Status    1)  How do you rate your confidence that you could achieve and keep an erection? 4-High   2) When you had erections with sexual stimulation, how often were your erections hard enough for penetration (entering your partner)? 5-Almost always or always   3)  During sexual intercourse, how often were you able to maintain your erection after you had penetrated (entered) into your partner? 5-Almost always or always   4) During sexual intercourse, how difficult was it to maintain your erection to completion of intercourse? 5-Not difficult   5) When you attempted sexual intercourse, how often was it satisfactory to you? 5-Almost always or always   Total Score: 24       Bowel Health Inventory  Current Status: 0-No problems, no rectal bleeding, no discharge, less then 5 bowel movements a day       Objective   VITAL SIGNS:   Vitals:    04/13/23 1317   BP: 132/85   Pulse: 63   Resp: 18   Temp: 97 °F (36.1 °C)   TempSrc: Temporal   SpO2: 97%   Weight: 81.2 kg (179 lb 1.6 oz)   Height: 170.2 cm (67\")   PainSc: 0-No pain        Karnofsky score: 90       Physical Exam:   Physical Exam  Vitals and nursing note reviewed.   Constitutional:       General: He is not in acute distress.     Appearance: He is well-developed.   HENT:      Head: Normocephalic and atraumatic.   Eyes:      Conjunctiva/sclera: Conjunctivae normal.      Pupils: Pupils are equal, round, and reactive to light.   Cardiovascular:      Rate and Rhythm: Normal rate and regular rhythm.      Heart sounds: No murmur heard.    No friction rub.   Pulmonary:      Effort: Pulmonary effort is normal.      Breath sounds: Normal breath sounds. No wheezing.   Abdominal:      General: Bowel sounds are normal. There is no distension.      Palpations: Abdomen is soft. There is no mass.      Tenderness: There is no abdominal tenderness.   Genitourinary:     Comments: RONNA was deferred today  Musculoskeletal:         General: " Normal range of motion.      Cervical back: Normal range of motion and neck supple.   Lymphadenopathy:      Cervical: No cervical adenopathy.   Skin:     General: Skin is warm and dry.   Neurological:      Mental Status: He is alert and oriented to person, place, and time.   Psychiatric:         Behavior: Behavior normal.         Thought Content: Thought content normal.         Judgment: Judgment normal.           LABORATORY  PSA      PATHOLOGY  Transrectal ultrasound-guided biopsy of the prostate 6/30/2022      Transrectal ultrasound-guided biopsy of the prostate 3/27/2023      IMAGING  I have personally reviewed the relevant imaging studies, as follows:  MRI Prostate 2/15/2023:  Findings:  Prostate size: 4.9 [CC] x 3.7 [AP] x 5.1 [transverse] cm for an overall volume of 48 cc.  PSAD = 0.089     Image quality: Artifact from air in rectum partially obscures peripheral zone assessment on DWI and ADC images.     Hemorrhage: No significant postbiopsy hemorrhage.     Peripheral Zone: Mild heterogeneous signal zone without discrete suspicious lesion.     Transition Zone: Heterogeneous transitional zone with left mid gland and apical transitional zone lesion described in detail below.     Lesion 1  PI-RADS assessment category: 5, Very high probability  T2: 5, lenticular or non-circumscribed, homogeneous, moderately hypointense, and >=1.5 cm in greatest dimension,   DWI: 5, focal markedly hypointense on ADC and markedly hyperintense on high b value DWI; >=1.5 in greatest dimension  DCE: Positive - Focal enhancement corresponding to suspicious finding   Size: 18 x13 x 21 mm, series 7 image 16-19 (series 8, image 14)  Side: Left  Zone: Involves both transition and central zones  Level of prostate: Vfvshpfb-ui-evkl  Location within transverse plane: Anteromedial  Extraprostatic extension: Abuts the prostatic capsule without findings of extraprostatic extension (EPE).     Seminal vesicles: Normal.     Lymph nodes: No pelvic  lymphadenopathy.     Osseous structures: No aggressive osseous lesion.     Additional findings: No free fluid in the pelvis. Visualized portions of the rectosigmoid colon are without acute abnormality.     IMPRESSION:  Impression:  1. Left  anteromedial ksecvrwm-mk-ywdq transition zone lesion, corresponding to an assessment category of PI-RADS 5 - Very high (clinically significant cancer is highly likely to be present).  2. Negative for pelvic lymphadenopathy.  3. No suspicious osseous lesion in the pelvis.  4. No clear evidence of EPE     Overall PI-RADS 5 exam.    The following portions of the patient's history were reviewed and updated as appropriate: allergies, current medications, past family history, past medical history, past social history, past surgical history and problem list.    Assessment:   Assessment  Mr. Montanez is a 62-year-old gentleman who presents now with a favorable intermediate risk prostate cancer.  I met with patient and his spouse today and we had a long discussion regarding the rationale, logistics, risks, and benefits of definitive treatment for prostate cancer.  He is also going to be meeting with the surgeons at the [facility] to discuss a robotic prostatectomy.  We focused our discussion today on the differences between surgical treatment versus definitive radiation, including an explanation of the differences in side effects as well as chances for disease control, which are essentially equal.  We did briefly discuss the differences in salvage therapy options, but I emphasized that the old adage of surgery first versus radiation first is mostly an inaccurate statement at this point, considering that the more significant  for recurrent disease is really microscopic disease outside of the prostate, and that control within the organ itself is not substantially different with surgery or radiation.  In his case, the more significant factor would be his rather dramatic rise  in his PSA which is concerning for that potential.  Regardless, I do believe this clearly warrants therapy.  After discussing radiation options, I do think he would be a good candidate for SBRT which we would deliver using Cyberknife.  We discussed the necessary pretreatment steps such as radiation treatment planning with a repeat pelvic MRI after fiducial stent placed, which we would also use to re-evaluate for any evidence of regional disease.  He will also benefit from placement of space OAR.  Lastly, we discussed the use of the low gas diet, alpha blockers, and the daily bowel prep during Cyberknife treatments.  I encouraged him to keep his appointment at the [facility] to discuss surgery.  He will be seeing Dr. Nye back next week to make final decisions.  If he elects for treatment with CyberKnife radiosurgery, then once he is able to have gold seed fiducials placed, then we will coordinate for him to return to our clinic for a reevaluation and treatment planning session in the upcoming week    RECOMMENDATIONS:   Patient to decide on definitive treatment option.  If he elects for treatment with radiation - Recommend Cyberknife Radiosurgery - 35 Gy in 5 fractions    I spent a total of 60 minutes on todays visit, with more than 45 minutes in direct face to face communication, and the remainder of the time spent in reviewing the relevant history, records, available imaging, and for documentation.    Follow Up:   Return in about 3 weeks (around 5/4/2023), or If he elects for definitive radiation treatment, for Radiation Simulation, Imaging - See orders.  Diagnoses and all orders for this visit:    1. Prostate cancer    Thank you for allowing me to participate in the care of this individual.    Sincerely,     John Domínguez MD

## 2023-04-20 ENCOUNTER — OFFICE VISIT (OUTPATIENT)
Dept: UROLOGY | Facility: CLINIC | Age: 63
End: 2023-04-20
Payer: COMMERCIAL

## 2023-04-20 VITALS
BODY MASS INDEX: 28.09 KG/M2 | DIASTOLIC BLOOD PRESSURE: 78 MMHG | WEIGHT: 179 LBS | SYSTOLIC BLOOD PRESSURE: 102 MMHG | HEIGHT: 67 IN

## 2023-04-20 DIAGNOSIS — C61 PROSTATE CANCER: Primary | ICD-10-CM

## 2023-04-20 PROCEDURE — 99214 OFFICE O/P EST MOD 30 MIN: CPT | Performed by: UROLOGY

## 2023-04-20 NOTE — PROGRESS NOTES
"Chief Complaint   Patient presents with   • Prostate Cancer     4 week fu discuss treatment options        HPI  Ms. Montanez is a 62 y.o. male with history below in assessment, who presents for follow up.     At this visit here to discuss options    Past Medical History:   Diagnosis Date   • Coronary artery disease     2 cardiac stents- 2015- Dr. Dejesus- [facility]   • Heart attack 03/2016   • Hypertension    • Prostate cancer    • PVD (peripheral vascular disease)        Past Surgical History:   Procedure Laterality Date   • CARDIAC CATHETERIZATION     • CORONARY ANGIOPLASTY WITH STENT PLACEMENT      X 2 2015   • HERNIA REPAIR           Current Outpatient Medications:   •  aspirin 81 MG EC tablet, Take 1 tablet by mouth Daily., Disp: , Rfl:   •  metoprolol succinate XL (TOPROL-XL) 25 MG 24 hr tablet, Take 1 tablet by mouth Daily., Disp: , Rfl:   •  nitroglycerin (NITROSTAT) 0.4 MG SL tablet, Place 1 tablet under the tongue., Disp: , Rfl:   •  rosuvastatin (CRESTOR) 20 MG tablet, Take 10 mg by mouth Daily., Disp: , Rfl:   •  metFORMIN (GLUCOPHAGE) 500 MG tablet, Take 1 tablet by mouth. (Patient not taking: Reported on 4/20/2023), Disp: , Rfl:      Physical Exam  Visit Vitals  /78 (BP Location: Right arm, Patient Position: Sitting, Cuff Size: Adult)   Ht 170.2 cm (67\")   Wt 81.2 kg (179 lb)   BMI 28.04 kg/m²       Labs  Brief Urine Lab Results     None          Lab Results   Component Value Date    GLUCOSE 115 (H) 03/27/2023    CALCIUM 9.3 03/27/2023     03/27/2023    K 4.9 03/27/2023    CO2 28.0 03/27/2023     03/27/2023    BUN 10 03/27/2023    CREATININE 1.04 03/27/2023    BCR 9.6 03/27/2023    ANIONGAP 5.0 03/27/2023       Lab Results   Component Value Date    WBC 7.37 04/17/2023    HGB 15.6 04/17/2023    HCT 45.1 04/17/2023    MCV 88 04/17/2023     04/17/2023            Lab Results   Component Value Date    PSA 5.16 (H) 04/17/2023    PSA 18.100 (H) 03/27/2023    PSA 4.3 (H) 03/29/2022 "         Radiographic Studies  MRI Prostate With & Without Contrast  Result Date: 2023  Impression: 1. Left  anteromedial wbxkfyqx-bb-xdqj transition zone lesion, corresponding to an assessment category of PI-RADS 5 - Very high (clinically significant cancer is highly likely to be present). 2. Negative for pelvic lymphadenopathy. 3. No suspicious osseous lesion in the pelvis. 4. No clear evidence of EPE Overall PI-RADS 5 exam. Electronically Signed: Chuy Gilmore  2023 11:49 PM EST  Workstation ID: [id number]      I have reviewed above labs and imaging.     Assessment  62 y.o. male with previously low risk prostate cancer, on active surveillance.  Based on recent biopsy he now has intermediate risk disease.  I have recommended treatment.    We had a long discussion about different treatment options with the risks and benefits.  We entered into his information into a prostate cancer nomogram from [facility].  With treatment, he has an 85% chance of BCR free survival at 5 years and 75% chance at 10 years.  His chance of having lymph node involvement is 3%, and chance of organ confined disease i.e. no extracapsular extension is 64%.    He is young, fairly healthy, though he has had cardiac stents for CAD in the past.  He is thin with no major abdominal operations, just to hernia surgery in the past.  I therefore think either option of surgery or radiation would be very reasonable, though I would favor surgery slightly for him.  His father had radiation therapy for prostate cancer and did very well with it.  He  of lung cancer.     Plan  1.  Schedule fiducials and Space OAR. He has significant CAD    I spent a total of 30 minutes with the patient and the chart engaging in data gathering and interpretation, patient interaction, as well as counseling on the risks, benefits, and alternatives of the therapy and coordinating care. \

## 2023-04-21 DIAGNOSIS — C61 PROSTATE CANCER: Primary | ICD-10-CM

## 2023-04-27 ENCOUNTER — TELEPHONE (OUTPATIENT)
Dept: RADIATION ONCOLOGY | Facility: HOSPITAL | Age: 63
End: 2023-04-27
Payer: COMMERCIAL

## 2023-04-27 DIAGNOSIS — C61 PROSTATE CANCER: Primary | ICD-10-CM

## 2023-04-27 NOTE — TELEPHONE ENCOUNTER
Pt notified of the following appts:  5/12/23-Markers/spaceOar with Dr. Nye ( previously aware-office to contact with instructions/directions)  5/23/23@ 10:00 clinic  @ 11:00 ct sim  @ 12:00 MRI  Educated on the importance of following the gas-eliminating diet with gas-x 4 times per day starting 5/21/23  Instructed to be NPO for 6 hours prior to MRI and to perform an enema the morning of 5/23/23  Verbalized understanding- pt states PCP ordered cologuard an it is on the way to him.    Referral to Radha Yancey RD

## 2023-05-12 ENCOUNTER — ANESTHESIA EVENT (OUTPATIENT)
Dept: PERIOP | Facility: HOSPITAL | Age: 63
End: 2023-05-12
Payer: COMMERCIAL

## 2023-05-12 ENCOUNTER — HOSPITAL ENCOUNTER (OUTPATIENT)
Facility: HOSPITAL | Age: 63
Setting detail: HOSPITAL OUTPATIENT SURGERY
Discharge: HOME | End: 2023-05-12
Attending: UROLOGY | Admitting: UROLOGY
Payer: COMMERCIAL

## 2023-05-12 ENCOUNTER — ANESTHESIA (OUTPATIENT)
Dept: PERIOP | Facility: HOSPITAL | Age: 63
End: 2023-05-12
Payer: COMMERCIAL

## 2023-05-12 VITALS
TEMPERATURE: 96.8 F | SYSTOLIC BLOOD PRESSURE: 148 MMHG | DIASTOLIC BLOOD PRESSURE: 96 MMHG | RESPIRATION RATE: 16 BRPM | OXYGEN SATURATION: 94 % | HEART RATE: 54 BPM

## 2023-05-12 DIAGNOSIS — C61 PROSTATE CANCER: ICD-10-CM

## 2023-05-12 PROCEDURE — 55874 TPRNL PLMT BIODEGRDABL MATRL: CPT | Performed by: UROLOGY

## 2023-05-12 PROCEDURE — 0 CEFAZOLIN SODIUM-DEXTROSE 2-3 GM-%(50ML) RECONSTITUTED SOLUTION: Performed by: UROLOGY

## 2023-05-12 PROCEDURE — 25010000002 FENTANYL CITRATE (PF) 50 MCG/ML SOLUTION: Performed by: NURSE ANESTHETIST, CERTIFIED REGISTERED

## 2023-05-12 PROCEDURE — A4648 IMPLANTABLE TISSUE MARKER: HCPCS | Performed by: UROLOGY

## 2023-05-12 PROCEDURE — 25010000002 PROPOFOL 10 MG/ML EMULSION: Performed by: NURSE ANESTHETIST, CERTIFIED REGISTERED

## 2023-05-12 PROCEDURE — 55876 PLACE RT DEVICE/MARKER PROS: CPT | Performed by: UROLOGY

## 2023-05-12 PROCEDURE — 25010000002 MIDAZOLAM PER 1MG: Performed by: NURSE ANESTHETIST, CERTIFIED REGISTERED

## 2023-05-12 DEVICE — SYS HYDROGEL SPACEOAR VUE 10ML: Type: IMPLANTABLE DEVICE | Site: PERIANAL | Status: FUNCTIONAL

## 2023-05-12 DEVICE — MARKR TISS SOFT/3PRELOAD17G 1.2 20CM GLD: Type: IMPLANTABLE DEVICE | Site: PERIANAL | Status: FUNCTIONAL

## 2023-05-12 RX ORDER — SODIUM CHLORIDE 0.9 % (FLUSH) 0.9 %
10 SYRINGE (ML) INJECTION AS NEEDED
Status: DISCONTINUED | OUTPATIENT
Start: 2023-05-12 | End: 2023-05-12 | Stop reason: HOSPADM

## 2023-05-12 RX ORDER — LIDOCAINE HYDROCHLORIDE 20 MG/ML
INJECTION, SOLUTION INTRAVENOUS AS NEEDED
Status: DISCONTINUED | OUTPATIENT
Start: 2023-05-12 | End: 2023-05-12 | Stop reason: SURG

## 2023-05-12 RX ORDER — FENTANYL CITRATE 50 UG/ML
INJECTION, SOLUTION INTRAMUSCULAR; INTRAVENOUS AS NEEDED
Status: DISCONTINUED | OUTPATIENT
Start: 2023-05-12 | End: 2023-05-12 | Stop reason: SURG

## 2023-05-12 RX ORDER — MIDAZOLAM HYDROCHLORIDE 2 MG/2ML
INJECTION, SOLUTION INTRAMUSCULAR; INTRAVENOUS AS NEEDED
Status: DISCONTINUED | OUTPATIENT
Start: 2023-05-12 | End: 2023-05-12 | Stop reason: SURG

## 2023-05-12 RX ORDER — CEFAZOLIN SODIUM 2 G/50ML
2 SOLUTION INTRAVENOUS ONCE
Status: COMPLETED | OUTPATIENT
Start: 2023-05-12 | End: 2023-05-12

## 2023-05-12 RX ORDER — SODIUM CHLORIDE, SODIUM LACTATE, POTASSIUM CHLORIDE, CALCIUM CHLORIDE 600; 310; 30; 20 MG/100ML; MG/100ML; MG/100ML; MG/100ML
1000 INJECTION, SOLUTION INTRAVENOUS CONTINUOUS
Status: DISCONTINUED | OUTPATIENT
Start: 2023-05-12 | End: 2023-05-12 | Stop reason: HOSPADM

## 2023-05-12 RX ADMIN — CEFAZOLIN SODIUM 2 G: 2 SOLUTION INTRAVENOUS at 11:19

## 2023-05-12 RX ADMIN — LIDOCAINE HYDROCHLORIDE 60 MG: 20 INJECTION, SOLUTION INTRAVENOUS at 11:19

## 2023-05-12 RX ADMIN — SODIUM CHLORIDE, POTASSIUM CHLORIDE, SODIUM LACTATE AND CALCIUM CHLORIDE 1000 ML: 600; 310; 30; 20 INJECTION, SOLUTION INTRAVENOUS at 08:50

## 2023-05-12 RX ADMIN — MIDAZOLAM HYDROCHLORIDE 2 MG: 1 INJECTION, SOLUTION INTRAMUSCULAR; INTRAVENOUS at 11:19

## 2023-05-12 RX ADMIN — FENTANYL CITRATE 100 MCG: 50 INJECTION INTRAMUSCULAR; INTRAVENOUS at 11:19

## 2023-05-12 RX ADMIN — PROPOFOL 200 MCG/KG/MIN: 10 INJECTION, EMULSION INTRAVENOUS at 11:19

## 2023-05-12 NOTE — ANESTHESIA PREPROCEDURE EVALUATION
Anesthesia Evaluation     Patient summary reviewed and Nursing notes reviewed   no history of anesthetic complications:  NPO Solid Status: > 8 hours  NPO Liquid Status: > 8 hours           Airway   Mallampati: II  TM distance: >3 FB  Neck ROM: full  Possible difficult intubation  Dental - normal exam     Pulmonary    (+) a smoker Former, decreased breath sounds,   Cardiovascular - normal exam    ECG reviewed  Patient on routine beta blocker  Rhythm: regular  Rate: normal    (+) hypertension, past MI , CAD, cardiac stents PVD,       Neuro/Psych  GI/Hepatic/Renal/Endo    (+)   diabetes mellitus,     Musculoskeletal     Abdominal  - normal exam    Abdomen: soft.  Bowel sounds: normal.   Substance History      OB/GYN          Other      history of cancer (Prostate) active                    Anesthesia Plan    ASA 3     MAC     (Risks and benefits discussed including risk of aspiration, recall and dental damage. All patient questions answered. Will continue with POC.)  intravenous induction     Anesthetic plan, risks, benefits, and alternatives have been provided, discussed and informed consent has been obtained with: patient.  Pre-procedure education provided      CODE STATUS:

## 2023-05-12 NOTE — ANESTHESIA POSTPROCEDURE EVALUATION
Patient: Cheikh Montanez    Procedure Summary     Date: 05/12/23 Room / Location:  [facility] /  [facility]    Anesthesia Start: 1113 Anesthesia Stop: 1203    Procedures:       PERIRECTAL SPACER APPLICATION FOR RADIATION TREATMENT      PROSTATE FIDUCIAL MARKER PLACEMENT Diagnosis:       Prostate cancer      (Prostate cancer [C61])    Surgeons: Ankush Nye MD Provider: Bebeto Noel CRNA    Anesthesia Type: MAC ASA Status: 3          Anesthesia Type: MAC    Vitals  No vitals data found for the desired time range.          Post Anesthesia Care and Evaluation    Patient location during evaluation: bedside  Patient participation: complete - patient participated  Level of consciousness: awake  Pain score: 0  Pain management: adequate    Airway patency: patent  Anesthetic complications: No anesthetic complications  PONV Status: controlled  Cardiovascular status: acceptable and stable  Respiratory status: acceptable and room air  Hydration status: acceptable    Comments: See nursing documentation for post op vital signs

## 2023-05-12 NOTE — OP NOTE
TRUS OF PROSTATE WITH GOLD FIDUCIAL MARKER AND PERIRECTAL SPACER PLACEMENT    Preoperative diagnosis  Prostate cancer    Postoperative diagnosis  Same    Procedure  1.  Transrectal ultrasound of the prostate  2.  Transperineal gold fiducial marker placement x 5  3.  Transrectal ultrasound needle guidance  4.  Transperineal instillation of perirectal spacer material, space OAR  5.  Exam Under Anesthesia    Attending Surgeon  Ankush Nye M.D.    Anesthesia  MAC    Complications  None    Specimen  None    Indications  62 y.o. y.o. Male with prostate cancer, presenting for perirectal spacer and fiducial marker placement.  The risks, benefits, and alternatives to the procedure were discussed with the patient, informed consent was obtained.    Procedure  The patient was brought to the operating theater and identified by name and medical record number.  A formal timeout was performed and he received IV antibiotics.  He underwent smooth induction of monitored anesthesia care.  He was then positioned in dorsal lithotomy, and prepped and draped in the usual sterile fashion with the scrotum taped out of the way. An exam under anesthesia was performed without and concerning findings. A transrectal brachytherapy ultrasound probe was prepared and placed into the rectum.  The prostate and seminal vesicles were inspected systematically using cross and sagittal views with the ultrasound.  Using an introducer kit, 5 gold fiducial markers were placed under ultrasound guidance, with 2 placed laterally at the base, 2 placed more medially in the mid gland, and 1 placed at the apex, taking care to try and achieve appropriate spacing.  We then did turned our attention to placing the space O AR perirectal hydrogel.  We were able to clearly identify the area of perirectal fat and Denonvieller's fascia, and under ultrasound guidance and in sagittal views an 18-gauge needle was placed in the midline approximately 1.5 cm above the probe and  advanced over the rectal hump, and into the space.  Using both sagittal and axial imaging, we identified that it was not within the prostate capsule, nor within the rectum.  We then Hydro dissected the space with a small amount of saline confirming location.  The perirectal space or hydrogel was then applied and created an excellent cushion starting in the mid gland and extending to both the base and the apex of the prostate.  This was confirmed once again on ultrasound imaging before the procedure was complete. The rectal ultrasound probe was removed.  The patient tolerated the procedure well.    Plan  1.  The patient will then present to the radiation oncologist for MRI and radiation planning  2.  Follow-up with me with a PSA 6 months after his SBRT

## 2023-05-12 NOTE — DISCHARGE INSTRUCTIONS
Home Care After Fiducial and Space OAR placement  The following instructions will help you care for yourself, or be cared for upon your return home today.    These are guidelines for your care right after surgery only.     Diet  Drink plenty of liquids and eat light meals today.    Start your regular diet tomorrow.    Activity  Start normal activities in twenty-four (24) hours.  Please do not do any bouncing, jarring, riding, straddle activities between now and when you have had your radiation completely done.    Wound Care and Hygiene  No restrictions, start normal routine.    Anesthesia Precautions & Expectations  After anesthesia, rest for 24 hours.    Do not drive, drink alcoholic beverages or make any important decisions during this time.  General anesthesia may cause a sore throat, jaw discomfort or muscle aches.    These symptoms can last for one or two days.     What to Expect after Surgery  Mild pain with voiding.  Frequency or urgency.  Bladder cramps.  Minimal bleeding with voiding.    Call your Doctor  Passing clots in urine preventing bladder emptying  Severe pain not controlled by oral medication  Temperature above 101.5 degrees  Inability to urinate within eight (8) hours after surgery      Other Contacts  Urology Office:  [address] [facility] [facility] [id number]   [place] [id number] [phone] office  [phone] fax    Other Instructions  Please call the radiation oncologists office and let them know you have had your fiducials and space OAR placed.    Follow up Appointment  Please follow-up with Dr. Nye in 6 months with a PSA beforehand

## 2023-05-12 NOTE — H&P
CC  No chief complaint on file.     HPI  Cheikh Montanez is a 62 y.o. with history of   1. Prostate cancer       No recent fevers or new LUTS  Does not take any blood thinners    Past Medical History  Past Medical History:   Diagnosis Date   • Coronary artery disease     2 cardiac stents- 2015- Dr. Dejesus- [facility]   • Heart attack 03/2016   • Hypertension    • Prostate cancer    • PVD (peripheral vascular disease)        Past Surgical History  Past Surgical History:   Procedure Laterality Date   • CARDIAC CATHETERIZATION     • CORONARY ANGIOPLASTY WITH STENT PLACEMENT      X 2 2015   • HERNIA REPAIR         Medications    Current Facility-Administered Medications:   •  ceFAZolin Sodium-Dextrose (ANCEF) IVPB (duplex) 2 g, 2 g, Intravenous, Once, Ankush Nye MD  •  lactated ringers infusion 1,000 mL, 1,000 mL, Intravenous, Continuous, Ankush Nye MD, Last Rate: 25 mL/hr at 05/12/23 0850, 1,000 mL at 05/12/23 0850  •  sodium chloride 0.9 % flush 10 mL, 10 mL, Intravenous, PRN, Ankush Nye MD    Allergies  No Known Allergies    Social History  Social History     Socioeconomic History   • Marital status:    Tobacco Use   • Smoking status: Former   • Smokeless tobacco: Former   • Tobacco comments:     Quit 2015   Vaping Use   • Vaping Use: Never used   Substance and Sexual Activity   • Alcohol use: Not Currently   • Drug use: Never   • Sexual activity: Defer       Review of Systems  Constitutional: No fevers or chills  Skin: Negative for rash  Endocrine: No heat/cold intolerance   Cardiovascular: Negative for chest pain or dyspnea on exertion  Respiratory: Negative for shortness of breath or wheezing  Gastrointestinal: No constipation, nausea or vomiting  Genitourinary: Negative for new lower urinary tract symptoms, current gross hematuria or dysuria.  Musculoskeletal: No flank pain  Neurological:  Negative for frequent headaches or dizziness  Lymph/Heme: Negative for leg  swelling or calf pain.    Physical Exam  Visit Vitals  /86 (BP Location: Right arm, Patient Position: Sitting)   Pulse 59   Temp 98 °F (36.7 °C) (Temporal)   Resp 18   SpO2 97%     Constitutional: NAD, WDWN.   HEENT: NCAT. Conjunctivae normal.  MMM.    Cardiovascular: Regular rate.  Pulmonary/Chest: Respirations are even and unlabored bilaterally.  Abdominal: Soft. No distension, tenderness, masses or guarding. No CVA tenderness.  Neurological: A + O x 3.  Cranial Nerves II-XII grossly intact. Normal gait.  Extremities: SHANNON x 4, Warm. No clubbing.  No cyanosis.    Skin: Pink, warm and dry.  No rashes noted.  Psychiatric:  Normal mood and affect    Labs & Imaging  Lab Results   Component Value Date    GLUCOSE 115 (H) 03/27/2023    CALCIUM 9.3 03/27/2023     03/27/2023    K 4.9 03/27/2023    CO2 28.0 03/27/2023     03/27/2023    BUN 10 03/27/2023    CREATININE 1.04 03/27/2023    BCR 9.6 03/27/2023    ANIONGAP 5.0 03/27/2023     Lab Results   Component Value Date    WBC 7.37 04/17/2023    HGB 15.6 04/17/2023    HCT 45.1 04/17/2023    MCV 88 04/17/2023     04/17/2023     Brief Urine Lab Results     None           No results found.      Assessment  Cheikh Montanez is a 62 y.o. male who presents with the following diagnosis:  1. Prostate cancer         Plan  1. To OR for PERIRECTAL SPACER APPLICATION FOR RADIATION TREATMENT, PROSTATE FIDUCIAL MARKER PLACEMENT     Ankush Nye MD

## 2023-05-18 ENCOUNTER — DOCUMENTATION (OUTPATIENT)
Dept: NUTRITION | Facility: HOSPITAL | Age: 63
End: 2023-05-18
Payer: COMMERCIAL

## 2023-05-23 ENCOUNTER — HOSPITAL ENCOUNTER (OUTPATIENT)
Dept: RADIATION ONCOLOGY | Facility: HOSPITAL | Age: 63
Setting detail: RADIATION/ONCOLOGY SERIES
Discharge: HOME | End: 2023-05-23
Payer: COMMERCIAL

## 2023-05-23 ENCOUNTER — HOSPITAL ENCOUNTER (OUTPATIENT)
Dept: MRI IMAGING | Facility: HOSPITAL | Age: 63
Discharge: HOME | End: 2023-05-23
Admitting: RADIOLOGY
Payer: COMMERCIAL

## 2023-05-23 ENCOUNTER — HOSPITAL ENCOUNTER (OUTPATIENT)
Dept: RADIATION ONCOLOGY | Facility: HOSPITAL | Age: 63
Discharge: HOME | End: 2023-05-23

## 2023-05-23 ENCOUNTER — OFFICE VISIT (OUTPATIENT)
Dept: RADIATION ONCOLOGY | Facility: HOSPITAL | Age: 63
End: 2023-05-23
Payer: COMMERCIAL

## 2023-05-23 VITALS
TEMPERATURE: 96 F | OXYGEN SATURATION: 98 % | DIASTOLIC BLOOD PRESSURE: 96 MMHG | RESPIRATION RATE: 18 BRPM | HEART RATE: 58 BPM | SYSTOLIC BLOOD PRESSURE: 166 MMHG | BODY MASS INDEX: 27.93 KG/M2 | WEIGHT: 178.3 LBS

## 2023-05-23 DIAGNOSIS — C61 PROSTATE CANCER: ICD-10-CM

## 2023-05-23 PROCEDURE — 72195 MRI PELVIS W/O DYE: CPT

## 2023-05-23 PROCEDURE — G0463 HOSPITAL OUTPT CLINIC VISIT: HCPCS | Performed by: RADIOLOGY

## 2023-05-23 RX ORDER — TAMSULOSIN HYDROCHLORIDE 0.4 MG/1
1 CAPSULE ORAL DAILY
Qty: 30 CAPSULE | Refills: 2 | Status: SHIPPED | OUTPATIENT
Start: 2023-05-23

## 2023-05-23 NOTE — PROGRESS NOTES
RE-EVALUATION    PATIENT:                                                      Cheikh Montanez  :                                                          1960  DATE:                          2023   DIAGNOSIS:     Prostate cancer  - Stage IIB (cT1c, cN0, cM0, PSA: 18.1, Grade Group: 2)     BRIEF HISTORY:  The patient is a very pleasant 62 y.o. male  with an intermediate risk prostate cancer.  I last saw him a couple of weeks ago and we discussed several different treatment options.  He is most interested in pursuing CyberKnife radiosurgery.  He has since underwent gold seed fiducial placement and SpaceOAR.  He returns to clinic today for reevaluation prior to undergoing treatment planning.  He denies any new symptoms.  His IPSS score today is 9, most significant for straining and urgency 50% of the time.    No Known Allergies    Review of Systems   All other systems reviewed and are negative.       IPSS Questionnaire (AUA-7):  Over the past month…     1)  Incomplete Emptying  How often have you had a sensation of not emptying your bladder?  0 - Not at all   2)  Frequency  How often have you had to urinate less than every two hours? 1 - Less than 1 time in 5   3)  Intermittency  How often have you found you stopped and started again several times when you urinated?  2 - Less than half the time   4) Urgency  How often have you found it difficult to postpone urination?  3 - About half the time   5) Weak Stream  How often have you had a weak urinary stream?  0 - Not at all   6) Straining  How often have you had to push or strain to begin urination?  3 - About half the time   7) Nocturia  How many times did you typically get up at night to urinate?  0 - None   Total Score:  9         Quality of life due to urinary symptoms:  If you were to spend the rest of your life with your urinary condition the way it is now, how would you feel about that? 0-Delighted   Urine Leakage (Incontinence) 0-No Leakage       Sexual Health Inventory  Current Status     1)  How do you rate your confidence that you could achieve and keep an erection? 4-High   2) When you had erections with sexual stimulation, how often were your erections hard enough for penetration (entering your partner)? 5-Almost always or always   3)  During sexual intercourse, how often were you able to maintain your erection after you had penetrated (entered) into your partner? 5-Almost always or always   4) During sexual intercourse, how difficult was it to maintain your erection to completion of intercourse? 5-Not difficult   5) When you attempted sexual intercourse, how often was it satisfactory to you? 5-Almost always or always   Total Score: 24         Bowel Health Inventory  Current Status: 0-No problems, no rectal bleeding, no discharge, less then 5 bowel movements a day             Objective   VITAL SIGNS:   Vitals:    05/23/23 1007   BP: 166/96   Pulse: 58   Resp: 18   Temp: 96 °F (35.6 °C)   TempSrc: Temporal   SpO2: 98%   Weight: 80.9 kg (178 lb 4.8 oz)   PainSc: 0-No pain        Karnofsky score: 90       Physical Exam  Vitals and nursing note reviewed.   Constitutional:       General: He is not in acute distress.     Appearance: He is well-developed.   HENT:      Head: Normocephalic and atraumatic.   Eyes:      Conjunctiva/sclera: Conjunctivae normal.      Pupils: Pupils are equal, round, and reactive to light.   Cardiovascular:      Rate and Rhythm: Normal rate and regular rhythm.      Heart sounds: No murmur heard.    No friction rub.   Pulmonary:      Effort: Pulmonary effort is normal.      Breath sounds: Normal breath sounds. No wheezing.   Abdominal:      General: Bowel sounds are normal. There is no distension.      Palpations: Abdomen is soft. There is no mass.      Tenderness: There is no abdominal tenderness.   Musculoskeletal:         General: Normal range of motion.      Cervical back: Normal range of motion and neck supple.    Lymphadenopathy:      Cervical: No cervical adenopathy.   Skin:     General: Skin is warm and dry.   Neurological:      Mental Status: He is alert and oriented to person, place, and time.   Psychiatric:         Behavior: Behavior normal.         Thought Content: Thought content normal.         Judgment: Judgment normal.          The following portions of the patient's history were reviewed and updated as appropriate: allergies, current medications, past family history, past medical history, past social history, past surgical history and problem list.    Diagnoses and all orders for this visit:    Prostate cancer  -     tamsulosin (FLOMAX) 0.4 MG capsule 24 hr capsule; Take 1 capsule by mouth Daily. Start 2 days prior to starting radiation treatments      IMPRESSION: Mr. Montanez is a 62-year-old gentleman with a clinical T1c, small volume Logan 3+4 = 7 adenocarcinoma of the prostate with a pretreatment PSA of 18.1 NG/mL.  He completed CyberKnife simulation and treatment planning today.  I will be working on his treatment plan with my physicist over the next week.  I anticipate treating his prostate to a dose of 35 Gy in 5 fractions of 7 Gy each.  I reiterated the importance of the low fiber diet, daily bowel prep, and prophylactic treatment with alpha-blockers in terms of managing his acute side effects.  I sent a prescription of Flomax to his pharmacy.  He should be ready to begin treatment in the next 1-2 weeks pending insurance authorization.    RECOMMENDATIONS: Return to clinic in 1-2 weeks to begin CyberKnife treatments.    I spent a total of 30 minutes on today's visit 20 minutes in direct face-to-face communication, and the remainder of time spent reviewing his medical records and for documentation.       John Domínguez MD

## 2023-05-24 PROCEDURE — 77399 UNLISTED PX MED RADJ PHYSICS: CPT | Performed by: RADIOLOGY

## 2023-06-01 ENCOUNTER — OFFICE VISIT (OUTPATIENT)
Dept: CARDIOLOGY | Facility: CLINIC | Age: 63
End: 2023-06-01

## 2023-06-01 VITALS
RESPIRATION RATE: 16 BRPM | OXYGEN SATURATION: 95 % | SYSTOLIC BLOOD PRESSURE: 111 MMHG | DIASTOLIC BLOOD PRESSURE: 70 MMHG | WEIGHT: 181.2 LBS | HEART RATE: 75 BPM | BODY MASS INDEX: 28.44 KG/M2 | HEIGHT: 67 IN

## 2023-06-01 DIAGNOSIS — I25.10 ASCVD (ARTERIOSCLEROTIC CARDIOVASCULAR DISEASE): Primary | ICD-10-CM

## 2023-06-01 DIAGNOSIS — E78.5 DYSLIPIDEMIA: ICD-10-CM

## 2023-06-01 NOTE — PROGRESS NOTES
Connie Reyes APRN  Cheikh Montanez  1960  06/01/2023    Patient Active Problem List   Diagnosis    Prostate cancer       Dear Connie Reyes APRN:    Subjective     Cheikh Montanez is a 62 y.o. male with the problems as listed above, presents    Chief Complaint   Patient presents with    Establish Care     Stenting x2 s/p MI 2015, Dr. Dejesus    Med Management     verbal       History of Present Illness: Mr. Montanez is a pleasant 62-year-old  male with history of known coronary artery disease with previous myocardial infarction followed by PCI/stenting at [facility] in 2015 by Dr. Bosch.  Patient since is being followed by TYESHA Cortes at the cardiologist group in [place].  He is now referred to us to establish cardiac care and follow-up to our office in view of previous history of coronary artery disease and reportedly having had residual 90% stenosis in a different coronary artery that apparently was not addressed at that time (by patient's account).      On further questioning denies any complaints of chest pains in the recent past.  He does get short of breath with moderate exertion with no PND, orthopnea or pedal edema.  He has previous history of smoking for 20 years of about a pack a day but quit 7 years ago he had his PCI.  He denies any palpitations, dizziness or syncope.    No Known Allergies:    Current Outpatient Medications:     aspirin 81 MG EC tablet, Take 1 tablet by mouth Daily., Disp: , Rfl:     metoprolol succinate XL (TOPROL-XL) 25 MG 24 hr tablet, Take 1 tablet by mouth Daily., Disp: , Rfl:     nitroglycerin (NITROSTAT) 0.4 MG SL tablet, Place 1 tablet under the tongue., Disp: , Rfl:     rosuvastatin (CRESTOR) 20 MG tablet, Take 10 mg by mouth Daily., Disp: , Rfl:     tamsulosin (FLOMAX) 0.4 MG capsule 24 hr capsule, Take 1 capsule by mouth Daily. Start 2 days prior to starting radiation treatments, Disp: 30 capsule, Rfl: 2    The following  "portions of the patient's history were reviewed and updated as appropriate: allergies, current medications, past family history, past medical history, past social history, past surgical history and problem list.    Social History     Tobacco Use    Smoking status: Former    Smokeless tobacco: Former    Tobacco comments:     Quit 2015   Vaping Use    Vaping Use: Never used   Substance Use Topics    Alcohol use: Not Currently    Drug use: Never     Review of Systems   Cardiovascular:  Negative for chest pain, leg swelling and palpitations.   Respiratory:  Negative for shortness of breath.    Objective   Vitals:    06/01/23 1526   BP: 111/70   Pulse: 75   Resp: 16   SpO2: 95%   Weight: 82.2 kg (181 lb 3.2 oz)   Height: 170.2 cm (67\")     Body mass index is 28.38 kg/m².    Vitals reviewed.   Constitutional:       Appearance: Well-developed.   Eyes:      Pupils: Pupils are equal, round, and reactive to light.   Neck:      Thyroid: No thyromegaly.      Vascular: No JVD.      Trachea: No tracheal deviation.      Lymphadenopathy: No cervical adenopathy.   Pulmonary:      Effort: Pulmonary effort is normal.      Breath sounds: Normal breath sounds.   Cardiovascular:      PMI at left midclavicular line. Normal rate. Regular rhythm. Normal S1. Normal S2.       Murmurs: There is no murmur.      No gallop.  No click. No rub.   Pulses:     Carotid: 2+ bilaterally.     Radial: 2+ bilaterally.     Dorsalis pedis: 1+ bilaterally.     Posterior tibial: 2+ bilaterally.  Edema:     Peripheral edema absent.   Abdominal:      General: Bowel sounds are normal.      Palpations: Abdomen is soft. There is no abdominal mass.      Tenderness: There is no abdominal tenderness.   Musculoskeletal: Normal range of motion.      Cervical back: Neck supple. Skin:     General: Skin is warm and dry.      Findings: No rash.   Neurological:      Mental Status: Alert and oriented to person, place, and time.       Lab Results   Component Value Date    NA " 139 03/27/2023    K 4.9 03/27/2023     03/27/2023    CO2 28.0 03/27/2023    BUN 10 03/27/2023    CREATININE 1.04 03/27/2023    GLUCOSE 115 (H) 03/27/2023    CALCIUM 9.3 03/27/2023     No results found for: CKTOTAL  Lab Results   Component Value Date    WBC 7.37 04/17/2023    HGB 15.6 04/17/2023    HCT 45.1 04/17/2023     04/17/2023       Lab Results   Component Value Date    PSA 5.16 (H) 04/17/2023        ECG 12 Lead    Date/Time: 6/1/2023 3:31 PM  Performed by: Gideon Hackett MD  Authorized by: Gideon Hackett MD   Comparison: compared with previous ECG from 3/28/2022  Similar to previous ECG  Rhythm: sinus rhythm  Conduction: conduction normal  ST Segments: ST segments normal    Clinical impression: normal ECG            Assessment & Plan    Diagnosis Plan   1. ASCVD (arteriosclerotic cardiovascular disease), s/p MI, s/p PCI/stenting in 2015 at [facility], details unavailable.  ECG 12 Lead  Adult Stress Echo W/ Cont or Stress Agent if Necessary Per Protocol       2. Dyslipidemia          Recommendations  Continue with aspirin, rosuvastatin and metoprolol succinate at current doses.  We will evaluate further with a stress echo study.    Return in about 5 weeks (around 7/6/2023).    As always, Connie Reyes APRN  I appreciate very much the opportunity to participate in the cardiovascular care of your patients. Please do not hesitate to call me with any questions with regards to Cheikh Montanez's evaluation and management.       With Best Regards,        Gideon Hackett MD, [facility]    Please note that portions of this note were completed with a voice recognition program.

## 2023-06-01 NOTE — LETTER
Laura 3, 2023     TYESHA Richey  [address]  [place] [address]    Patient: Cheikh Montanez   YOB: 1960   Date of Visit: 6/1/2023       Dear Connie Chaudhary:    Thank you for referring Cheikh Montanez to me for evaluation. Below are the relevant portions of my assessment and plan of care.    If you have questions, please do not hesitate to call me. I look forward to following Cheikh along with you.         Sincerely,        Gideon Hackett MD        CC: No Recipients    Gideon Hackett MD  06/03/23 1725  Incomplete  Connie Reyes APRN  Cheikh Montanez  1960  06/01/2023    Patient Active Problem List   Diagnosis   • Prostate cancer       Dear Connie Reyes APRN:    Subjective     Cheikh Montanez is a 62 y.o. male with the problems as listed above, presents    Chief Complaint   Patient presents with   • Establish Care     Stenting x2 s/p MI 2015, Dr. Dejesus   • Med Management     verbal       History of Present Illness: Mr. Montanez is a pleasant 62-year-old  male with history of known coronary artery disease with previous myocardial infarction followed by PCI/stenting at [facility] in 2015 by Dr. Bosch.  Patient since is being followed by TYESHA Cortes at the cardiologist group in [place].  He is now referred to us to establish cardiac care and follow-up to our office in view of previous history of coronary artery disease and reportedly having had residual 90% stenosis in a different coronary artery that apparently was not addressed at that time (by patient's account).      On further questioning denies any complaints of chest pains in the recent past.  He does get short of breath with moderate exertion with no PND, orthopnea or pedal edema.  He has previous history of smoking for 20 years of about a pack a day but quit 7 years ago he had his PCI.  He denies any palpitations, dizziness or syncope.    No Known Allergies:    Current Outpatient  "Medications:   •  aspirin 81 MG EC tablet, Take 1 tablet by mouth Daily., Disp: , Rfl:   •  metoprolol succinate XL (TOPROL-XL) 25 MG 24 hr tablet, Take 1 tablet by mouth Daily., Disp: , Rfl:   •  nitroglycerin (NITROSTAT) 0.4 MG SL tablet, Place 1 tablet under the tongue., Disp: , Rfl:   •  rosuvastatin (CRESTOR) 20 MG tablet, Take 10 mg by mouth Daily., Disp: , Rfl:   •  tamsulosin (FLOMAX) 0.4 MG capsule 24 hr capsule, Take 1 capsule by mouth Daily. Start 2 days prior to starting radiation treatments, Disp: 30 capsule, Rfl: 2    The following portions of the patient's history were reviewed and updated as appropriate: allergies, current medications, past family history, past medical history, past social history, past surgical history and problem list.    Social History     Tobacco Use   • Smoking status: Former   • Smokeless tobacco: Former   • Tobacco comments:     Quit 2015   Vaping Use   • Vaping Use: Never used   Substance Use Topics   • Alcohol use: Not Currently   • Drug use: Never     Review of Systems   Cardiovascular: Negative for chest pain, leg swelling and palpitations.   Respiratory: Negative for shortness of breath.      Objective   Vitals:    06/01/23 1526   BP: 111/70   Pulse: 75   Resp: 16   SpO2: 95%   Weight: 82.2 kg (181 lb 3.2 oz)   Height: 170.2 cm (67\")     Body mass index is 28.38 kg/m².    Vitals reviewed.   Constitutional:       Appearance: Well-developed.   Eyes:      Pupils: Pupils are equal, round, and reactive to light.   Neck:      Thyroid: No thyromegaly.      Vascular: No JVD.      Trachea: No tracheal deviation.      Lymphadenopathy: No cervical adenopathy.   Pulmonary:      Effort: Pulmonary effort is normal.      Breath sounds: Normal breath sounds.   Cardiovascular:      PMI at left midclavicular line. Normal rate. Regular rhythm. Normal S1. Normal S2.      Murmurs: There is no murmur.      No gallop. No click. No rub.   Pulses:     Carotid: 2+ bilaterally.     Radial: 2+ " bilaterally.     Dorsalis pedis: 1+ bilaterally.     Posterior tibial: 2+ bilaterally.  Edema:     Peripheral edema absent.   Abdominal:      General: Bowel sounds are normal.      Palpations: Abdomen is soft. There is no abdominal mass.      Tenderness: There is no abdominal tenderness.   Musculoskeletal: Normal range of motion.      Cervical back: Neck supple. Skin:     General: Skin is warm and dry.      Findings: No rash.   Neurological:      Mental Status: Alert and oriented to person, place, and time.         Lab Results   Component Value Date     03/27/2023    K 4.9 03/27/2023     03/27/2023    CO2 28.0 03/27/2023    BUN 10 03/27/2023    CREATININE 1.04 03/27/2023    GLUCOSE 115 (H) 03/27/2023    CALCIUM 9.3 03/27/2023     No results found for: CKTOTAL  Lab Results   Component Value Date    WBC 7.37 04/17/2023    HGB 15.6 04/17/2023    HCT 45.1 04/17/2023     04/17/2023       Lab Results   Component Value Date    PSA 5.16 (H) 04/17/2023        ECG 12 Lead    Date/Time: 6/1/2023 3:31 PM  Performed by: Gideon Hackett MD  Authorized by: Gideon Hackett MD   Comparison: compared with previous ECG from 3/28/2022  Similar to previous ECG  Rhythm: sinus rhythm  Conduction: conduction normal  ST Segments: ST segments normal    Clinical impression: normal ECG                Assessment & Plan    Diagnosis Plan   1. ASCVD (arteriosclerotic cardiovascular disease), s/p MI, s/p PCI/stenting in 2015 at [facility], details unavailable.        2. Dyslipidemia          Recommendations  Continue with aspirin, rosuvastatin and metoprolol succinate at current doses.  We will evaluate further with a stress echo study.    No follow-ups on file.    As always, Connie Reyes APRN  I appreciate very much the opportunity to participate in the cardiovascular care of your patients. Please do not hesitate to call me with any questions with regards to Cheikh Montanez's evaluation and management.       With  Best Regards,        Gideon Hackett MD, [facility]    Please note that portions of this note were completed with a voice recognition program.            Gideon Hackett MD  06/01/23 1651  Sign when Signing Visit  Connie Reyes APRN  Cheikh Montanez  1960  06/01/2023    Patient Active Problem List   Diagnosis   • Prostate cancer       Dear Connie Reyes APRN:    Subjective      Cheikh Montanez is a 62 y.o. male with the problems as listed above, presents    Chief Complaint   Patient presents with   • Establish Care     Stenting x2 s/p MI 2015, Dr. Dejesus   • Med Management     verbal       History of Present Illness: Mr. Montanez is a pleasant 62-year-old  male with history of known coronary artery disease with previous myocardial infarction followed by PCI/stenting at [facility] in 2015 by Dr. Bosch.  Patient since is being followed by TYESHA Cortes at the cardiologist group in [place].  He is now referred to us to establish cardiac care and follow-up to our office in view of previous history of coronary artery disease and reportedly having had residual 90% stenosis in a different coronary artery that apparently was not addressed at that time (by patient's account).  On further questioning denies any complaints of chest pains in the recent past.  He does get short of breath with moderate exertion with no PND, orthopnea or pedal edema.  He has previous history of smoking for 20 years of about a pack a day but quit 7 years ago he had his PCI.  He denies any palpitations, dizziness or syncope.    No Known Allergies:    Current Outpatient Medications:   •  aspirin 81 MG EC tablet, Take 1 tablet by mouth Daily., Disp: , Rfl:   •  metoprolol succinate XL (TOPROL-XL) 25 MG 24 hr tablet, Take 1 tablet by mouth Daily., Disp: , Rfl:   •  nitroglycerin (NITROSTAT) 0.4 MG SL tablet, Place 1 tablet under the tongue., Disp: , Rfl:   •  rosuvastatin (CRESTOR) 20 MG tablet, Take 10 mg  "by mouth Daily., Disp: , Rfl:   •  tamsulosin (FLOMAX) 0.4 MG capsule 24 hr capsule, Take 1 capsule by mouth Daily. Start 2 days prior to starting radiation treatments, Disp: 30 capsule, Rfl: 2    The following portions of the patient's history were reviewed and updated as appropriate: allergies, current medications, past family history, past medical history, past social history, past surgical history and problem list.    Social History     Tobacco Use   • Smoking status: Former   • Smokeless tobacco: Former   • Tobacco comments:     Quit 2015   Vaping Use   • Vaping Use: Never used   Substance Use Topics   • Alcohol use: Not Currently   • Drug use: Never     Review of Systems   Cardiovascular: Negative for chest pain, leg swelling and palpitations.   Respiratory: Negative for shortness of breath.      Objective    Vitals:    06/01/23 1526   BP: 111/70   Pulse: 75   Resp: 16   SpO2: 95%   Weight: 82.2 kg (181 lb 3.2 oz)   Height: 170.2 cm (67\")     Body mass index is 28.38 kg/m².    Vitals reviewed.   Constitutional:       Appearance: Well-developed.   Eyes:      Pupils: Pupils are equal, round, and reactive to light.   Neck:      Thyroid: No thyromegaly.      Vascular: No JVD.      Trachea: No tracheal deviation.      Lymphadenopathy: No cervical adenopathy.   Pulmonary:      Effort: Pulmonary effort is normal.      Breath sounds: Normal breath sounds.   Cardiovascular:      PMI at left midclavicular line. Normal rate. Regular rhythm. Normal S1. Normal S2.      Murmurs: There is no murmur.      No gallop. No click. No rub.   Pulses:     Carotid: 2+ bilaterally.     Radial: 2+ bilaterally.     Dorsalis pedis: 1+ bilaterally.     Posterior tibial: 2+ bilaterally.  Edema:     Peripheral edema absent.   Abdominal:      General: Bowel sounds are normal.      Palpations: Abdomen is soft. There is no abdominal mass.      Tenderness: There is no abdominal tenderness.   Musculoskeletal: Normal range of motion.      Cervical " back: Neck supple. Skin:     General: Skin is warm and dry.      Findings: No rash.   Neurological:      Mental Status: Alert and oriented to person, place, and time.         Lab Results   Component Value Date     03/27/2023    K 4.9 03/27/2023     03/27/2023    CO2 28.0 03/27/2023    BUN 10 03/27/2023    CREATININE 1.04 03/27/2023    GLUCOSE 115 (H) 03/27/2023    CALCIUM 9.3 03/27/2023     No results found for: CKTOTAL  Lab Results   Component Value Date    WBC 7.37 04/17/2023    HGB 15.6 04/17/2023    HCT 45.1 04/17/2023     04/17/2023       Lab Results   Component Value Date    PSA 5.16 (H) 04/17/2023        ECG 12 Lead    Date/Time: 6/1/2023 3:31 PM  Performed by: Gideon Hackett MD  Authorized by: Gideon Hackett MD   Comparison: compared with previous ECG from 3/28/2022  Similar to previous ECG  Rhythm: sinus rhythm  Conduction: conduction normal  ST Segments: ST segments normal    Clinical impression: normal ECG              Assessment & Plan    Diagnosis Plan   1. ASCVD (arteriosclerotic cardiovascular disease), s/p MI, s/p PCI/stenting in 2015 at [facility], details unavailable.        2. Dyslipidemia          Recommendations  Continue with aspirin, rosuvastatin and metoprolol succinate at current doses.  We will evaluate further with a stress echo study.  No follow-ups on file.    As always, Connie Reyes APRN  I appreciate very much the opportunity to participate in the cardiovascular care of your patients. Please do not hesitate to call me with any questions with regards to Cheikh Montanez's evaluation and management.       With Best Regards,        Gideon Hackett MD, [facility]    Please note that portions of this note were completed with a voice recognition program.

## 2023-06-12 ENCOUNTER — HOSPITAL ENCOUNTER (OUTPATIENT)
Dept: RADIATION ONCOLOGY | Facility: HOSPITAL | Age: 63
Discharge: HOME | End: 2023-06-12

## 2023-06-13 ENCOUNTER — HOSPITAL ENCOUNTER (OUTPATIENT)
Dept: RADIATION ONCOLOGY | Facility: HOSPITAL | Age: 63
Discharge: HOME | End: 2023-06-13

## 2023-06-14 ENCOUNTER — HOSPITAL ENCOUNTER (OUTPATIENT)
Dept: RADIATION ONCOLOGY | Facility: HOSPITAL | Age: 63
Discharge: HOME | End: 2023-06-14

## 2023-06-15 ENCOUNTER — HOSPITAL ENCOUNTER (OUTPATIENT)
Dept: RADIATION ONCOLOGY | Facility: HOSPITAL | Age: 63
Discharge: HOME | End: 2023-06-15

## 2023-06-16 ENCOUNTER — HOSPITAL ENCOUNTER (OUTPATIENT)
Dept: RADIATION ONCOLOGY | Facility: HOSPITAL | Age: 63
Discharge: HOME | End: 2023-06-16

## 2023-07-11 PROBLEM — I25.10 ASCVD (ARTERIOSCLEROTIC CARDIOVASCULAR DISEASE): Status: ACTIVE | Noted: 2023-07-11

## 2023-07-11 PROBLEM — I10 ESSENTIAL HYPERTENSION: Status: ACTIVE | Noted: 2023-07-11

## 2023-07-26 ENCOUNTER — OFFICE VISIT (OUTPATIENT)
Dept: RADIATION ONCOLOGY | Facility: HOSPITAL | Age: 63
End: 2023-07-26
Payer: COMMERCIAL

## 2023-07-26 ENCOUNTER — HOSPITAL ENCOUNTER (OUTPATIENT)
Dept: RADIATION ONCOLOGY | Facility: HOSPITAL | Age: 63
Setting detail: RADIATION/ONCOLOGY SERIES
Discharge: HOME | End: 2023-07-26
Payer: COMMERCIAL

## 2023-07-26 DIAGNOSIS — C61 PROSTATE CANCER: Primary | ICD-10-CM

## 2023-07-26 PROCEDURE — G0463 HOSPITAL OUTPT CLINIC VISIT: HCPCS

## 2023-07-26 NOTE — PROGRESS NOTES
TELEMEDICINE FOLLOW UP NOTE    PATIENT:                                                      Cheikh Montanez  MEDICAL RECORD #:                        [id number]  :                                                          1960  COMPLETION DATE:   2023  DIAGNOSIS:     Prostate cancer  - Stage IIB (cT1c, cN0, cM0, PSA: 18.1, Grade Group: 2)      This visit has been converted to a telehealth virtual visit, the patient's preferred method for today's follow-up.  Total time of discussion was 17 minutes.  The patient has given verbal consent.      BRIEF HISTORY:  Cheikh Montanez is a 62 y.o. gentleman presenting via telemedicine for initial follow-up visit of his intermediate risk prostate cancer.  He was previously undergoing active observation for a low risk, Brilliant 3+3 =6 prostate cancer diagnosed in .  He was found to have rising PSA to a value of 18.1 NG/mL, with repeat biopsy now showing Logan 3+4 = 7 disease in the left transition zone.  The patient was then interested in definitive treatment.  He underwent a course of CyberKnife SBRT, completing 2023.  He tolerated treatment well.  He did develop brief exacerbation of urinary irritative and outflow obstructive symptoms, noting a couple weeks of posttreatment dysuria which resolved with use of ibuprofen.  He reports increased urinary frequency, urgency, and weak stream beyond baseline, which continue to slowly subside.  He has since discontinued use of Flomax.  He also reports a few episodes of loose stool which were managed with diet modifications.  He reports bowel function is now normal.  No acute GI complaints.  Treatment related fatigue is slowly subsiding.  He denies additional acute concerns today.        IPSS Questionnaire (AUA-7):  Over the past month…    1)  Incomplete Emptying  How often have you had a sensation of not emptying your bladder?  0 - Not at all   2)  Frequency  How often have you had to urinate less than every two  hours? 3 - About half the time   3)  Intermittency  How often have you found you stopped and started again several times when you urinated?  2 - Less than half the time   4) Urgency  How often have you found it difficult to postpone urination?  3 - About half the time   5) Weak Stream  How often have you had a weak urinary stream?  0 - Not at all   6) Straining  How often have you had to push or strain to begin urination?  3 - About half the time   7) Nocturia  How many times did you typically get up at night to urinate?  1 - 1 time   Total Score:  12       Quality of life due to urinary symptoms:  If you were to spend the rest of your life with your urinary condition the way it is now, how would you feel about that? 2-Mostly Satisfied   Urine Leakage (Incontinence) 0-No Leakage     Sexual Health Inventory  Current Status    1)  How do you rate your confidence that you could achieve and keep an erection? 4-High   2) When you had erections with sexual stimulation, how often were your erections hard enough for penetration (entering your partner)? 5-Almost always or always   3)  During sexual intercourse, how often were you able to maintain your erection after you had penetrated (entered) into your partner? 5-Almost always or always   4) During sexual intercourse, how difficult was it to maintain your erection to completion of intercourse? 5-Not difficult   5) When you attempted sexual intercourse, how often was it satisfactory to you? 5-Almost always or always   Total Score: 24       Bowel Health Inventory  Current Status: 0-No problems, no rectal bleeding, no discharge, less then 5 bowel movements a day         MEDICATIONS: Medication reconciliation for the patient was reviewed and confirmed in the electronic medical record.    Review of Systems   Constitutional:  Positive for fatigue.   Genitourinary:  Positive for frequency.    All other systems reviewed and are negative.        KPS 90%      Physical  Exam  Pulmonary:      Respirations even, unlabored. No audible wheezing or cough.  Neurological:      A+Ox4, conversant, answers questions appropriately.  Psychiatric:     Judgement, affect, and decision-making WNL.    Limited physical exam as visit was conducted remotely via telephone.          The following portions of the patient's history were reviewed and updated as appropriate: allergies, current medications, past family history, past medical history, past social history, past surgical history and problem list.         Diagnoses and all orders for this visit:    1. Prostate cancer (Primary)         IMPRESSION:  Cheikh Montanez is a 62 y.o. gentleman with a clinical T1c, small volume Logan 3+4 = 7 adenocarcinoma of the prostate with a pretreatment PSA of 18.1 NG/mL, found to have benjamin rise in PSA while under active observation.  He is now 1 month status post CyberKnife SBRT.  He tolerated treatment well.  He developed the anticipated acute grade 1 urinary and bowel side effects, which were managed conservatively and continue to appropriately subside at this point.  He was able to discontinue alpha-blocker without issue.  Mr. Montanez and I have reviewed the survivorship care plan in detail.  We discussed diagnosis, follow-up intervals, biannual PSA monitoring, and expectations for response to treatment.  A copy of the care plan has been mailed to the patient.  A copy has also been sent to his PCP.    RECOMMENDATIONS:   Cheikh Montanez continues routine urologic surveillance under the care of Dr. Nye, with follow-up and repeat PSA scheduled 9/25/2023.  We will schedule the patient to return to our clinic in 6 months, or sooner as needed.      Return in about 6 months (around 1/26/2024) for Office Visit.    TYESHA Fernandez spent a total of 30 minutes on today's visit, with more than 17 minutes in virtual communication with the patient via telephone, and the remainder of the time spent in reviewing the  relevant history, records, available imaging, and for documentation.

## 2023-09-15 ENCOUNTER — TELEPHONE (OUTPATIENT)
Dept: UROLOGY | Facility: CLINIC | Age: 63
End: 2023-09-15

## 2023-09-15 NOTE — TELEPHONE ENCOUNTER
Caller: BEBO CABRERA    Relationship: Emergency Contact    Best call back number: [phone]    What orders are you requesting (i.e. lab or imaging): PSA     In what timeframe would the patient need to come in: PT NEEDS TO COMPLETE BY MONDAY.     Where will you receive your lab/imaging services: [facility]  [address] [phone]    Additional notes: RECEIVED A CALL FROM PT'S SPOUSE. REQUESTING ORDERS TO BE FAXED TO [facility] BECAUSE IT IS CLOSER TO HOME AND PT IS AWARE TO COMPLETE BY MONDAY. PT HAS APPT ON 9/21/23. ADVISED SPOUSE TO FOLLOW UP WITH US ON MONDAY TO BE SURE WE HAVE FAXED ORDER TO [facility]. PROVIDED HER WITH OUR FAX NUMBER FOR [facility] TO FAX RESULTS. OFFICE FAX NUMBER [phone]

## 2023-09-19 ENCOUNTER — TELEPHONE (OUTPATIENT)
Dept: UROLOGY | Facility: CLINIC | Age: 63
End: 2023-09-19

## 2023-09-19 NOTE — TELEPHONE ENCOUNTER
Hub staff attempted to follow warm transfer process and was unsuccessful     Caller: BEBO CABRERA     Relationship to patient: SELF    Best call back number:  [phone]     Patient is needing: PT IS AT [facility] IN [place] TO HAVE HIS PSA LABS DONE. AN ORDER WAS TO BE FAXED LAST WEEK BUT [facility] SAYS THEY DO NOT HAVE AN ORDER.    PLEASE FAX THE ORDER [phone], PT IS THERE NOW.

## 2023-09-20 LAB — PSA SERPL-MCNC: 1.4 NG/ML (ref 0–4)

## 2023-09-25 ENCOUNTER — OFFICE VISIT (OUTPATIENT)
Dept: UROLOGY | Facility: CLINIC | Age: 63
End: 2023-09-25

## 2023-09-25 VITALS
HEART RATE: 56 BPM | SYSTOLIC BLOOD PRESSURE: 130 MMHG | TEMPERATURE: 97.9 F | BODY MASS INDEX: 27.94 KG/M2 | DIASTOLIC BLOOD PRESSURE: 84 MMHG | WEIGHT: 178 LBS | OXYGEN SATURATION: 99 % | HEIGHT: 67 IN

## 2023-09-25 DIAGNOSIS — N52.9 ERECTILE DYSFUNCTION, UNSPECIFIED ERECTILE DYSFUNCTION TYPE: ICD-10-CM

## 2023-09-25 DIAGNOSIS — C61 PROSTATE CANCER: Primary | ICD-10-CM

## 2023-09-25 DIAGNOSIS — R39.9 LOWER URINARY TRACT SYMPTOMS (LUTS): ICD-10-CM

## 2023-09-25 PROCEDURE — 99213 OFFICE O/P EST LOW 20 MIN: CPT | Performed by: UROLOGY

## 2023-09-25 RX ORDER — CHOLECALCIFEROL (VITAMIN D3) 1250 MCG
1 CAPSULE ORAL WEEKLY
COMMUNITY
Start: 2023-08-08

## 2023-09-25 NOTE — PROGRESS NOTES
"CC  PCa  LUTS    HPI  Mr. Montanez is a 62 y.o. male with history below in assessment, who presents for follow up.     At this visit nocturia x1. Urgency improved    Past Medical History:   Diagnosis Date    Coronary artery disease     2 cardiac stents- 2015- Dr. Dejesus- [facility]    Heart attack 03/2016    Hypertension     Prostate cancer     PVD (peripheral vascular disease)        Past Surgical History:   Procedure Laterality Date    CARDIAC CATHETERIZATION      CORONARY ANGIOPLASTY WITH STENT PLACEMENT      X 2 2015    CYBERKNIFE  06/16/2023    Prostate/SV    HERNIA REPAIR      PROSTATE FIDUCIAL MARKER PLACEMENT N/A 05/12/2023    Procedure: PROSTATE FIDUCIAL MARKER PLACEMENT;  Surgeon: Ankush Nye MD;  Location: [facility];  Service: Urology;  Laterality: N/A;    PROSTATE FIDUCIAL MARKER PLACEMENT           Current Outpatient Medications:     aspirin 81 MG EC tablet, Take 1 tablet by mouth Daily., Disp: , Rfl:     Cholecalciferol (Vitamin D3) 1.25 MG (32188 UT) capsule, Take 1 capsule by mouth 1 (One) Time Per Week., Disp: , Rfl:     metoprolol succinate XL (TOPROL-XL) 25 MG 24 hr tablet, Take 1 tablet by mouth Daily., Disp: , Rfl:     nitroglycerin (NITROSTAT) 0.4 MG SL tablet, Place 1 tablet under the tongue., Disp: , Rfl:     rosuvastatin (CRESTOR) 20 MG tablet, Take 0.5 tablets by mouth Daily., Disp: , Rfl:      Physical Exam  Visit Vitals  /84 (BP Location: Left arm, Patient Position: Sitting, Cuff Size: Adult)   Pulse 56   Temp 97.9 °F (36.6 °C) (Temporal)   Ht 170.2 cm (67.01\")   Wt 80.7 kg (178 lb)   SpO2 99%   BMI 27.87 kg/m²       Labs  Brief Urine Lab Results       None            Lab Results   Component Value Date    GLUCOSE 115 (H) 03/27/2023    CALCIUM 9.3 03/27/2023     03/27/2023    K 4.9 03/27/2023    CO2 28.0 03/27/2023     03/27/2023    BUN 10 03/27/2023    CREATININE 1.04 03/27/2023    BCR 9.6 03/27/2023    ANIONGAP 5.0 03/27/2023       Lab Results   Component " Value Date    WBC 7.37 04/17/2023    HGB 15.6 04/17/2023    HCT 45.1 04/17/2023    MCV 88 04/17/2023     04/17/2023            Lab Results   Component Value Date    PSA 1.4 09/19/2023    PSA 5.16 (H) 04/17/2023    PSA 18.100 (H) 03/27/2023             Radiographic Studies  No Images in the past 120 days found..      I have reviewed above labs and imaging.     Assessment  62 y.o. male with intermediate risk prostate cancer, status post SBRT May 2023.  PSA has responded well. Minimal LUTS. Mild ED    Plan  1.  Follow-up in 6 months with FANNY with PSA prior

## 2023-10-27 ENCOUNTER — OFFICE VISIT (OUTPATIENT)
Dept: PULMONOLOGY | Facility: CLINIC | Age: 63
End: 2023-10-27
Payer: COMMERCIAL

## 2023-10-27 VITALS
OXYGEN SATURATION: 96 % | HEART RATE: 69 BPM | HEIGHT: 67 IN | SYSTOLIC BLOOD PRESSURE: 120 MMHG | BODY MASS INDEX: 28.41 KG/M2 | WEIGHT: 181 LBS | TEMPERATURE: 98.2 F | DIASTOLIC BLOOD PRESSURE: 90 MMHG

## 2023-10-27 DIAGNOSIS — I27.20 PULMONARY HYPERTENSION: Primary | ICD-10-CM

## 2023-10-27 DIAGNOSIS — G47.33 OSA (OBSTRUCTIVE SLEEP APNEA): ICD-10-CM

## 2023-10-27 DIAGNOSIS — J44.9 COPD, MILD: ICD-10-CM

## 2023-10-27 DIAGNOSIS — F17.218 CIGARETTE NICOTINE DEPENDENCE WITH OTHER NICOTINE-INDUCED DISORDER: ICD-10-CM

## 2023-10-27 LAB
CHROMATIN AB SERPL-ACNC: 19 IU/ML (ref 0–14)
TSH SERPL DL<=0.05 MIU/L-ACNC: 0.48 UIU/ML (ref 0.27–4.2)

## 2023-10-27 PROCEDURE — 84443 ASSAY THYROID STIM HORMONE: CPT | Performed by: INTERNAL MEDICINE

## 2023-10-27 PROCEDURE — 86037 ANCA TITER EACH ANTIBODY: CPT | Performed by: INTERNAL MEDICINE

## 2023-10-27 PROCEDURE — 86431 RHEUMATOID FACTOR QUANT: CPT | Performed by: INTERNAL MEDICINE

## 2023-10-27 PROCEDURE — 83516 IMMUNOASSAY NONANTIBODY: CPT | Performed by: INTERNAL MEDICINE

## 2023-10-27 PROCEDURE — 86200 CCP ANTIBODY: CPT | Performed by: INTERNAL MEDICINE

## 2023-10-27 PROCEDURE — 86038 ANTINUCLEAR ANTIBODIES: CPT | Performed by: INTERNAL MEDICINE

## 2023-10-27 RX ORDER — LEVALBUTEROL TARTRATE 45 UG/1
3 AEROSOL, METERED ORAL ONCE
Status: COMPLETED | OUTPATIENT
Start: 2023-10-27 | End: 2023-10-27

## 2023-10-27 RX ADMIN — LEVALBUTEROL TARTRATE 3 PUFF: 45 AEROSOL, METERED ORAL at 14:39

## 2023-10-27 NOTE — PROGRESS NOTES
New Pulmonary Patient Office Visit      Patient Name: Cheikh Montanez    Referring Physician: Connie Reyes APRN    Chief Complaint:    Chief Complaint   Patient presents with    Sleep Apnea     New Patient        History of Present Illness: Cheikh Montanez is a 63 y.o. male who is here today to establish care with Pulmonary.      63-year-old male with past medical history of coronary disease, hypertension, recent diagnosis of prostate cancer s/p XRT with normalization of PSA referred here for further evaluation of shortness of breath with heavy exertional activity.  Patient was undergoing work-up per cardiology.  He underwent stress echo.  He was noted to have mildly elevated pulmonary artery pressures and is referred for evaluation of pulmonary hypertension.  As far as I can tell patient did not have right heart cath done.  He has been smoker of 1 pack/day for 25 years.  Quit in 2017 when he had his heart attack.  States that he generally does not have any cough, sputum production.  Denies any wheezing or chest tightness.  States that if he is taking his time then he does not feel short of breath but if he is having a tough day he has to lift something heavier than he get somewhat out of breath.  Denies any flareups requiring hospitalizations or ER visits steroid or antibiotic use.  No secondhand smoke exposure currently.  Patient works in maintenance at a factory.  Denies any other occupational or environmental exposures.    Patient states that he sleeps good but he has been told that he snores loudly.  Does not get fully rested sleep.  Never been tested for sleep apnea.  He also has occasional episodes where he is acting out his dreams.  Few months ago he had episode where he jumped out of bed and hit the dresser and had laceration to his right eye.  Patient denies any Parkinson's symptoms.  Denies any family history of neurodegenerative disorder.  Patient states that he occasionally sleep  talks.    Patient denies any alcohol use.  No illicit drug use.    Subjective      Review of Systems:   Review of Systems   Constitutional:  Positive for fatigue.   HENT: Negative.     Respiratory:  Positive for shortness of breath (with heavy exertions).    Cardiovascular:  Positive for leg swelling (only right leg).   Gastrointestinal: Negative.    Endocrine: Negative.    Skin: Negative.    Neurological: Negative.    Hematological: Negative.    Psychiatric/Behavioral:  Positive for sleep disturbance.    All other systems reviewed and are negative.      Past Medical History:   Past Medical History:   Diagnosis Date    Coronary artery disease     2 cardiac stents- 2015- Dr. Dejesus- [facility]    Heart attack 03/2016    Hypertension     Prostate cancer     PVD (peripheral vascular disease)        Past Surgical History:   Past Surgical History:   Procedure Laterality Date    CARDIAC CATHETERIZATION      CORONARY ANGIOPLASTY WITH STENT PLACEMENT      X 2 2015    CYBERKNIFE  06/16/2023    Prostate/SV    HERNIA REPAIR      PROSTATE FIDUCIAL MARKER PLACEMENT N/A 05/12/2023    Procedure: PROSTATE FIDUCIAL MARKER PLACEMENT;  Surgeon: Ankush Nye MD;  Location: [facility];  Service: Urology;  Laterality: N/A;    PROSTATE FIDUCIAL MARKER PLACEMENT         Family History:   Family History   Problem Relation Age of Onset    Breast cancer Mother     Lung cancer Mother     Lung cancer Father     Prostate cancer Father     Stomach cancer Niece        Social History:   Social History     Socioeconomic History    Marital status:    Tobacco Use    Smoking status: Former    Smokeless tobacco: Former    Tobacco comments:     Quit 2015   Vaping Use    Vaping Use: Never used   Substance and Sexual Activity    Alcohol use: Not Currently    Drug use: Never    Sexual activity: Defer       Medications:     Current Outpatient Medications:     aspirin 81 MG EC tablet, Take 1 tablet by mouth Daily., Disp: , Rfl:      "Cholecalciferol (Vitamin D3) 1.25 MG (44646 UT) capsule, Take 1 capsule by mouth 1 (One) Time Per Week., Disp: , Rfl:     metoprolol succinate XL (TOPROL-XL) 25 MG 24 hr tablet, Take 1 tablet by mouth Daily., Disp: , Rfl:     nitroglycerin (NITROSTAT) 0.4 MG SL tablet, Place 1 tablet under the tongue., Disp: , Rfl:     rosuvastatin (CRESTOR) 20 MG tablet, Take 0.5 tablets by mouth Daily., Disp: , Rfl:     Allergies:   No Known Allergies    Objective     Physical Exam:  Vital Signs:   Vitals:    10/27/23 1403   BP: 120/90   BP Location: Left arm   Patient Position: Sitting   Cuff Size: Adult   Pulse: 69   Temp: 98.2 °F (36.8 °C)   TempSrc: Infrared   SpO2: 96%  Comment: room air at rest   Weight: 82.1 kg (181 lb)   Height: 170.2 cm (67\")       Physical Exam  Vitals and nursing note reviewed.   Constitutional:       General: He is not in acute distress.     Appearance: He is well-developed. He is not diaphoretic.   HENT:      Head: Normocephalic and atraumatic.      Comments: Mallampati 3 airway     Nose: Nose normal.      Mouth/Throat:      Pharynx: No oropharyngeal exudate.   Eyes:      Pupils: Pupils are equal, round, and reactive to light.   Neck:      Thyroid: No thyromegaly.      Trachea: No tracheal deviation.   Cardiovascular:      Rate and Rhythm: Normal rate and regular rhythm.      Heart sounds: Normal heart sounds. No murmur heard.     No friction rub. No gallop.   Pulmonary:      Effort: Pulmonary effort is normal. No respiratory distress.      Breath sounds: Normal breath sounds. No wheezing or rales.   Musculoskeletal:         General: No tenderness.      Cervical back: Neck supple.      Right lower leg: No edema.      Left lower leg: No edema.      Comments: Clubbing: none   Lymphadenopathy:      Cervical: No cervical adenopathy.   Neurological:      Mental Status: He is alert and oriented to person, place, and time.   Psychiatric:         Behavior: Behavior normal.         Thought Content: Thought " content normal.         Judgment: Judgment normal.         Results Review:   I reviewed the patient's new clinical results.    Patient Dr. Charlton CT scan reviewed and I do not see any suspicious lung nodules, adenopathy or effusions.      PFT Done today reviewed personally and showed mild obstruction.  No significant bronchodilator response.  Normal lung volumes.  Mild reduction in DLCO.      Assessment / Plan      Assessment:   Problem List Items Addressed This Visit          Pulmonary and Pneumonias    COPD, mild     Other Visit Diagnoses       Pulmonary hypertension    -  Primary    Relevant Orders    ARMEN With / DsDNA, RNP, Sjogrens A / B, Chacko    TSH Rfx On Abnormal To Free T4    ANCA Panel    Rheumatoid Factor    Cyclic Citrul Peptide Antibody, IgG / IgA    TIMBO (obstructive sleep apnea)        Relevant Orders    Home Sleep Study    Cigarette nicotine dependence with other nicotine-induced disorder                Plan:   1.  Patient with minimal exertional shortness of breath.  Noted to have mild RSVP elevation noted on echocardiogram.  Pulmonary hypertension is likely multifactorial.  No mention of diastolic dysfunction on the echocardiogram.  Discussed patient pulmonary hypertension diagnosis and management of it.  Since it does not fit wit primary pulmonary hypertension I would work-up for secondary causes.  Patient has PFTs done today which shows mild COPDh.  He is not hypoxic.  We will monitor for now.  If symptoms get worse will consider inhaler therapy but patient agreed that he does not think he needs to be on any inhaler therapy at this point as he is doing pretty well.    2.  I will recommend work-up for rheumatology cardiovascular tach etiologies for pulmonary hypertension.  I ordered blood work today and he will get it done.  We will check thyroid studies as well.    3.  Home sleep study offered today given BiPAP clinical probability of sleep apnea.  He is amenable to that.  We will set him up for  sleep study in [place] and follow-up after to go over the results and discuss further management options.    4.  Patient was commended on quitting smoking and advised to stay off of smoking.    5.  Advised patient to stay active to prevent deconditioning which can further shortness of breath.    6.  If follow-up testing is acceptable then we will recommend repeat echocardiogram in 6 months and if we see worsening of pulmonary pressures then will benefit from right heart cath with vasodilator challenge.  If primary cardiologist service do not perform this test then will refer to [facility].    7.  Patient will need annual CT scan for lung cancer screening.    Thank you for allowing me to participate in the care of this patient.    Follow Up:     4 months with HST.    Discussed plan of care in detail with patient today. Patient verbally understands and agrees. I spent 48 minutes on this date of service. This time includes time spent by me in the following activities:preparing for the visit, reviewing tests, obtaining and/or reviewing a separately obtained history, performing a medically appropriate examination, counseling the patient, ordering tests, or procedures, and/or documenting information in the medical record. This time excludes other separate billable services such as interpretation of tests or procedures, if applicable.        Naun Shirley MD  Pulmonary Critical Care and Sleep Medicine

## 2023-10-28 LAB — CCP IGA+IGG SERPL IA-ACNC: 9 UNITS (ref 0–19)

## 2023-10-30 LAB
ANA SER QL: NEGATIVE
C-ANCA TITR SER IF: NORMAL TITER
MYELOPEROXIDASE AB SER IA-ACNC: <0.2 UNITS (ref 0–0.9)
P-ANCA ATYPICAL TITR SER IF: NORMAL TITER
P-ANCA TITR SER IF: NORMAL TITER
PROTEINASE3 AB SER IA-ACNC: <0.2 UNITS (ref 0–0.9)

## 2023-12-13 ENCOUNTER — HOSPITAL ENCOUNTER (OUTPATIENT)
Dept: SLEEP MEDICINE | Facility: HOSPITAL | Age: 63
Discharge: HOME | End: 2023-12-13
Admitting: INTERNAL MEDICINE
Payer: COMMERCIAL

## 2023-12-13 VITALS — BODY MASS INDEX: 28.41 KG/M2 | WEIGHT: 181 LBS | HEIGHT: 67 IN

## 2023-12-13 DIAGNOSIS — G47.33 OSA (OBSTRUCTIVE SLEEP APNEA): ICD-10-CM

## 2023-12-13 PROCEDURE — 95806 SLEEP STUDY UNATT&RESP EFFT: CPT

## 2024-01-26 ENCOUNTER — HOSPITAL ENCOUNTER (OUTPATIENT)
Dept: RADIATION ONCOLOGY | Facility: HOSPITAL | Age: 64
Setting detail: RADIATION/ONCOLOGY SERIES
Discharge: HOME | End: 2024-01-26
Payer: COMMERCIAL

## 2024-01-26 ENCOUNTER — OFFICE VISIT (OUTPATIENT)
Dept: RADIATION ONCOLOGY | Facility: HOSPITAL | Age: 64
End: 2024-01-26
Payer: COMMERCIAL

## 2024-01-26 DIAGNOSIS — C61 PROSTATE CANCER: Primary | ICD-10-CM

## 2024-01-26 RX ORDER — TADALAFIL 5 MG/1
5 TABLET ORAL DAILY PRN
Qty: 30 TABLET | Refills: 2 | Status: SHIPPED | OUTPATIENT
Start: 2024-01-26

## 2024-01-26 NOTE — PROGRESS NOTES
TELEMEDICINE FOLLOW-UP NOTE    01/26/2024    Problem List Items Addressed This Visit          Other    Prostate cancer - Primary    Cancer Staging   Stage IIB (cT1c, cN0, cM0, PSA: 18.1, Grade Group: 2)    Time Devoted to Visit: 15 min including time to review his previous imaging and records, with 50% devoted to direct discussion.    Reason for Visit:  I personally contacted Cheikh Montanez  today in an effort to screen for coronavirus symptoms and also to perform their scheduled follow-up visit remotely.    SUBJECTIVE:  With respect to their cancer diagnosis, Mr. Montanez has a history of a unfavorable intermediate risk prostate cancer, which appeared to arise in the setting of chronic prostatitis given his elevated PSA.  He was treated with CyberKnife radiosurgery and he completed treatments 8 months ago in June 2023.  He initially responded to treatment quite favorably and his PSA reduced down to 1.4 NG/mL by September.  He has been very pleased, although at least for the past month he reports he has been experiencing increasing urgency, nocturia 2-3 times nightly, as well as reduced durability with erections.  He did notes that this was a specific change over the past 3 months.  He denies fevers, chills, or other symptoms of pain.  He denies any gastrointestinal symptoms.            EXAM FINDINGS AND/OR PROBLEMS:  No new exam findings    LABORATORY      ASSESSMENT/PLAN: Cheikh Montanez is a 63 y.o. year old male with a history of an unfavorable intermediate risk prostate cancer.  He has responded well to treatment as indicated by his downtrending PSA.  We discussed the survivorship model including the timeframe for repeat PSA testing and ongoing surveillance, which he already has coordinated to continue in March when he will see Dr. Nye again for follow-up.  With respect to his ongoing symptoms, I suspect he is having a flare of prostatitis, therefore I have instructed him to take ibuprofen 600  mg twice daily for the next week and will also start him on daily Cialis to improve blood flow and improve his erectile function.  If he responds to those treatments, then we will likely continue until he sees Dr. Nye.  Alternatively, if his symptoms persist, he may benefit from a course of antibiotics with intentions of empirically treating prostatitis.    Start Cialis 5mg daily  Ibuprofen 600mg BID x 1 week  If his symptoms are not improved, may consider treating with antibiotics  Repeat PSA in 1 month with Dr. Nye as scheduled    RECOMMENDATIONS:  Return in about 6 months (around 7/26/2024) for Telehealth.    John Domínguez MD

## 2024-03-19 ENCOUNTER — TELEPHONE (OUTPATIENT)
Dept: UROLOGY | Facility: CLINIC | Age: 64
End: 2024-03-19
Payer: COMMERCIAL

## 2024-03-20 LAB — PSA SERPL-MCNC: 1.6 NG/ML (ref 0–4)

## 2024-03-27 ENCOUNTER — OFFICE VISIT (OUTPATIENT)
Dept: UROLOGY | Facility: CLINIC | Age: 64
End: 2024-03-27
Payer: COMMERCIAL

## 2024-03-27 VITALS
SYSTOLIC BLOOD PRESSURE: 126 MMHG | HEIGHT: 67 IN | BODY MASS INDEX: 28.41 KG/M2 | WEIGHT: 181 LBS | DIASTOLIC BLOOD PRESSURE: 76 MMHG

## 2024-03-27 DIAGNOSIS — C61 PROSTATE CANCER: ICD-10-CM

## 2024-03-27 RX ORDER — TADALAFIL 5 MG/1
5 TABLET ORAL DAILY
Qty: 30 TABLET | Refills: 11 | Status: SHIPPED | OUTPATIENT
Start: 2024-03-27

## 2024-03-27 NOTE — PROGRESS NOTES
Office Visit Established Male Patient     Patient Name: Cheikh Montanez  : 1960   MRN: [id number]     Chief Complaint:   Chief Complaint   Patient presents with    Follow-up     6 month        History of Present Illness: Mr. Cheikh Montanez is a 63 y.o. male who presents today for follow up with prostate cancer.   Urgency is his most bothersome symptoms can not wait at all , then his stream is weak   Has mild ED  Patient reports he saw Dr. Domínguez in January discussed his urinary symptoms he did start him on daily tadalafil patient was unclear if he was supposed to take it daily and has only been taking it occasionally if his symptoms are increased he does feel when taking the medicine it improves his symptoms  He also has taken ibuprofen as needed per Dr. Domínguez        Subjective      Review of System:   As noted in HPI    Past Medical History:   Past Medical History:   Diagnosis Date    Coronary artery disease     2 cardiac stents- - Dr. Dejesus- [facility]    Heart attack 2016    Hypertension     Prostate cancer     PVD (peripheral vascular disease)        Past Surgical History:   Past Surgical History:   Procedure Laterality Date    CARDIAC CATHETERIZATION      CORONARY ANGIOPLASTY WITH STENT PLACEMENT      X 2     CYBERKNIFE  2023    Prostate/SV    HERNIA REPAIR      PROSTATE FIDUCIAL MARKER PLACEMENT N/A 2023    Procedure: PROSTATE FIDUCIAL MARKER PLACEMENT;  Surgeon: Ankush Nye MD;  Location[facility];  Service: Urology;  Laterality: N/A;    PROSTATE FIDUCIAL MARKER PLACEMENT         Family History:   Family History   Problem Relation Age of Onset    Breast cancer Mother     Lung cancer Mother     Lung cancer Father     Prostate cancer Father     Stomach cancer Niece        Social History:   Social History     Socioeconomic History    Marital status:    Tobacco Use    Smoking status: Former     Passive exposure: Never    Smokeless tobacco:  "Former    Tobacco comments:     Quit 2015   Vaping Use    Vaping status: Never Used   Substance and Sexual Activity    Alcohol use: Not Currently    Drug use: Never    Sexual activity: Defer       Medications:     Current Outpatient Medications:     tadalafil (Cialis) 5 MG tablet, Take 1 tablet by mouth Daily., Disp: 30 tablet, Rfl: 11    aspirin 81 MG EC tablet, Take 1 tablet by mouth Daily., Disp: , Rfl:     Cholecalciferol (Vitamin D3) 1.25 MG (15082 UT) capsule, Take 1 capsule by mouth 1 (One) Time Per Week., Disp: , Rfl:     metoprolol succinate XL (TOPROL-XL) 25 MG 24 hr tablet, Take 1 tablet by mouth Daily., Disp: , Rfl:     nitroglycerin (NITROSTAT) 0.4 MG SL tablet, Place 1 tablet under the tongue., Disp: , Rfl:     rosuvastatin (CRESTOR) 20 MG tablet, Take 0.5 tablets by mouth Daily., Disp: , Rfl:     Allergies:   No Known Allergies    Objective     Physical Exam:   Vital Signs:   Vitals:    03/27/24 1030   BP: 126/76   Weight: 82.1 kg (181 lb)   Height: 170.2 cm (67.01\")     Body mass index is 28.34 kg/m².     Physical Exam  Constitutional: NAD, WDWN.   Neurological: A + O x 3.   Psychiatric:  Normal mood and affect    Labs  Brief Urine Lab Results       None            Lab Results   Component Value Date    GLUCOSE 115 (H) 03/27/2023    CALCIUM 9.3 03/27/2023     03/27/2023    K 4.9 03/27/2023    CO2 28.0 03/27/2023     03/27/2023    BUN 10 03/27/2023    CREATININE 1.04 03/27/2023    BCR 9.6 03/27/2023    ANIONGAP 5.0 03/27/2023       Lab Results   Component Value Date    WBC 7.37 04/17/2023    HGB 15.6 04/17/2023    HCT 45.1 04/17/2023    MCV 88 04/17/2023     04/17/2023            Radiographic Studies  Home Sleep Study    Result Date: 12/20/2023         1.  Severe obstructive sleep apnea.      2.  Nocturnal hypoxemia. (18.2% time below 90% O2 sat)      3.  Snoring.     RECOMMENDATIONS: 1.  Available data is suggestive of severe obstructive sleep apnea along with significant sleep " hypoxia likely secondary to sleep disordered breathing. Recommend auto CPAP trial 4 to 20 cm of water pressure and follow up with CPAP download to make sure we are not missing any other pathology.  2.  Discuss good sleep hygiene habits, including but not limited to fixed wake up and sleep time, avoid alcohol 4 hours before sleep and not driving while drowsy. Maintain ideal body weight.      I have conducted an epoch by epoch review of the raw data and agree with the interpretation.  Naun Shirley MD, [facility] Pul[facility] Critical care and Sleep medicine      I have reviewed the above labs and imaging.     Assessment / Plan      Assessment/Plan:   Diagnoses and all orders for this visit:    1. Prostate cancer  -     tadalafil (Cialis) 5 MG tablet; Take 1 tablet by mouth Daily.  Dispense: 30 tablet; Refill: 11    63-year-old male with prostate cancer stage IIB (cT1c, cN0, cM0, PSA: 18.1, Grade Group: 2) here for 6-month PSA follow-up.  PSA had a slight bump but remains stable at 1.6 patient is status post SBRT 6/2023.  Had an in depth discussion about taking his medication daily to properly manage his urinary symptoms we will have patient do this for 1 month and we will reevaluate.  If his symptoms have not improved with him taking medication appropriately we will consider treatment for prostatitis as Dr. Domínguez recommended this if no improvements on tadalafil. We did discuss he will need to take the medication consistently to see if he has improvements in urinary symptoms    Restart tadalafil 5 mg daily  Consider treatment for prostatitis if no improvement in symptoms  PSA 6 months and follow up with Thi    I spent a total of 31 minutes with the patient and the chart engaging in data gathering and interpretation, patient interaction, as well as counseling on the risks, benefits, and alternatives of the therapy and coordinating care.      Follow Up:   Return in about 1 month (around 4/27/2024).    Maryam Joyner,  TYESHA,NP-C  [facility] [place]

## 2024-04-30 ENCOUNTER — OFFICE VISIT (OUTPATIENT)
Dept: UROLOGY | Facility: CLINIC | Age: 64
End: 2024-04-30
Payer: COMMERCIAL

## 2024-04-30 VITALS
TEMPERATURE: 97.6 F | DIASTOLIC BLOOD PRESSURE: 90 MMHG | SYSTOLIC BLOOD PRESSURE: 130 MMHG | HEART RATE: 64 BPM | WEIGHT: 177.4 LBS | OXYGEN SATURATION: 100 % | RESPIRATION RATE: 12 BRPM | BODY MASS INDEX: 27.78 KG/M2

## 2024-04-30 DIAGNOSIS — N32.81 OVERACTIVE BLADDER: Primary | ICD-10-CM

## 2024-04-30 PROBLEM — I83.891 SYMPTOMATIC VARICOSE VEINS OF RIGHT LOWER EXTREMITY: Status: ACTIVE | Noted: 2024-04-30

## 2024-04-30 PROBLEM — E78.00 HYPERCHOLESTEROLEMIA: Status: ACTIVE | Noted: 2024-04-30

## 2024-04-30 PROBLEM — I25.10 ATHEROSCLEROTIC CARDIOVASCULAR DISEASE: Status: ACTIVE | Noted: 2024-04-30

## 2024-04-30 PROBLEM — E78.5 DYSLIPIDEMIA: Status: ACTIVE | Noted: 2023-03-20

## 2024-04-30 PROBLEM — I82.811: Status: ACTIVE | Noted: 2024-04-30

## 2024-04-30 PROBLEM — I87.2 CHRONIC VENOUS INSUFFICIENCY OF LOWER EXTREMITY: Status: ACTIVE | Noted: 2024-04-30

## 2024-04-30 LAB
BILIRUB BLD-MCNC: NEGATIVE MG/DL
CLARITY, POC: CLEAR
COLOR UR: ABNORMAL
EXPIRATION DATE: ABNORMAL
GLUCOSE UR STRIP-MCNC: NEGATIVE MG/DL
KETONES UR QL: NEGATIVE
LEUKOCYTE EST, POC: NEGATIVE
Lab: ABNORMAL
NITRITE UR-MCNC: NEGATIVE MG/ML
PH UR: 6.5 [PH] (ref 5–8)
PROT UR STRIP-MCNC: ABNORMAL MG/DL
RBC # UR STRIP: NEGATIVE /UL
SP GR UR: 1.01 (ref 1–1.03)
UROBILINOGEN UR QL: ABNORMAL

## 2024-04-30 RX ORDER — VIBEGRON 75 MG/1
75 TABLET, FILM COATED ORAL DAILY
Qty: 30 TABLET | Refills: 5 | Status: SHIPPED | OUTPATIENT
Start: 2024-04-30

## 2024-04-30 NOTE — PROGRESS NOTES
Office Visit Established Male Patient     Patient Name: Cheikh Montanez  : 1960   MRN: [id number]     Chief Complaint:   Chief Complaint   Patient presents with    Prostate Cancer    Follow-up       History of Present Illness: Mr. Cheikh Montanez is a 63 y.o. male with history of intermediate risk prostate cancer, [place] 3+3=6.  Underwent SBRT in 2023.  He was recently seen in our office 1 month ago and instructed to start taking Cialis 5 mg once daily for LUTS with his most bothersome complaint being urinary urgency.  PSA 1.6 ng/mL in 2024 which was slightly increased from 1.4 ng/mL in September.  Today his IPSS is 12 with his most bothersome symptoms being urinary urgency and frequency.  Drinks 16 ounces of water per day, 1 can of soda, and 1 cup of coffee per day.    IPSS Questionnaire (AUA-7):  Incomplete emptying  Over the past month, how often have you had a sensation of not emptying your bladder completely after you finished urinating?: Not at all (24 102)  Frequency  Over the past month, how often have you had to urinate again less than two hours after you finishied urinating ?: About half the time (24)  Intermittency  Over the past month, how often have you found you stopped and started again several time when you urinated ?: Less than 1 time in 5 (24)  Urgency  Over the last month, how often have you found it difficult  have you found it difficult to postpone urination ?: More than half the time (24 102)  Weak Stream  Over the past month, how often have you had a weak urinary stream ?: Less than 1 time in 5 (24)  Straining  Over the past month, how often have you had to push or strain to begin urination ?: Less than half the time (24)  Nocturia  Over the past month, how many times did you most typically get up to urinate from the time you went to bed until the time you got up in the morning ?: 1 time  (04/30/24 1021)  Quality of life due to urinary symptoms  If you were to spend the rest of your life with your urinary condition the way it is now, how would feel about that?: Unhappy (04/30/24 1021)    Scores  Total IPSS Score: (!) 12 (04/30/24 1021)  Total Score = Symptomatic Level: Moderately symptomatic: 8-19 (04/30/24 1021)      Subjective      Review of System: ROS reviewed by me and is otherwise negative unless noted in HPI.     Past Medical History:   Past Medical History:   Diagnosis Date    Coronary artery disease     [facility]    Heart attack 03/2016    Hypertension     Prostate cancer     PVD (peripheral vascular disease)      Past Surgical History:   Past Surgical History:   Procedure Laterality Date    CARDIAC CATHETERIZATION      CORONARY ANGIOPLASTY WITH STENT PLACEMENT      X 2 2015    CYBERKNIFE  06/16/2023    Prostate/SV    HERNIA REPAIR      PROSTATE FIDUCIAL MARKER PLACEMENT N/A 05/12/2023    Procedure: PROSTATE FIDUCIAL MARKER PLACEMENT;  Surgeon: Ankush Nye MD;  Location: [facility];  Service: Urology;  Laterality: N/A;    PROSTATE FIDUCIAL MARKER PLACEMENT       Family History:   Family History   Problem Relation Age of Onset    Breast cancer Mother     Lung cancer Mother     Lung cancer Father     Prostate cancer Father     Stomach cancer Niece      Social History:   Social History     Socioeconomic History    Marital status:    Tobacco Use    Smoking status: Former     Passive exposure: Never    Smokeless tobacco: Former    Tobacco comments:     Quit 2015   Vaping Use    Vaping status: Never Used   Substance and Sexual Activity    Alcohol use: Not Currently    Drug use: Never    Sexual activity: Defer     Medications:     Current Outpatient Medications:     aspirin 81 MG EC tablet, Take 1 tablet by mouth Daily., Disp: , Rfl:     Cholecalciferol (Vitamin D3) 1.25 MG (09751 UT) capsule, Take 1 capsule by mouth 1 (One) Time Per Week.,  Disp: , Rfl:     metoprolol succinate XL (TOPROL-XL) 25 MG 24 hr tablet, Take 0.5 tablets by mouth Daily., Disp: , Rfl:     rosuvastatin (CRESTOR) 20 MG tablet, Take 1 tablet by mouth Daily., Disp: , Rfl:     tadalafil (Cialis) 5 MG tablet, Take 1 tablet by mouth Daily., Disp: 30 tablet, Rfl: 11    nitroglycerin (NITROSTAT) 0.4 MG SL tablet, Place 1 tablet under the tongue. (Patient not taking: Reported on 4/30/2024), Disp: , Rfl:     Vibegron (Gemtesa) 75 MG tablet, Take 1 tablet by mouth Daily., Disp: 30 tablet, Rfl: 5    Allergies:   No Known Allergies    Objective     Physical Exam:   Vital Signs:   Vitals:    04/30/24 0953   BP: 130/90   BP Location: Left arm   Patient Position: Sitting   Cuff Size: Adult   Pulse: 64   Resp: 12   Temp: 97.6 °F (36.4 °C)   TempSrc: Temporal   SpO2: 100%   Weight: 80.5 kg (177 lb 6.4 oz)     Body mass index is 27.78 kg/m².   Physical Exam  Vitals and nursing note reviewed.   Constitutional:       General: He is not in acute distress.     Appearance: Normal appearance. He is not ill-appearing.   HENT:      Head: Normocephalic and atraumatic.   Pulmonary:      Effort: Pulmonary effort is normal.   Abdominal:      Hernia: There is no hernia in the left inguinal area or right inguinal area.   Genitourinary:     Pubic Area: No rash.       Penis: Normal.       Testes: Normal.      Epididymis:      Right: Normal.      Left: Normal.      Prostate: Enlarged (mild). Not tender and no nodules present.   Skin:     General: Skin is warm and dry.   Neurological:      General: No focal deficit present.      Mental Status: He is alert and oriented to person, place, and time.   Psychiatric:         Mood and Affect: Mood normal.         Behavior: Behavior normal.     Labs  Brief Urine Lab Results  (Last result in the past 365 days)        Color   Clarity   Blood   Leuk Est   Nitrite   Protein   CREAT   Urine HCG        04/30/24 1108 Dark Yellow   Clear   Negative   Negative   Negative   Trace                  Lab Results   Component Value Date    GLUCOSE 115 (H) 03/27/2023    CALCIUM 9.3 03/27/2023     03/27/2023    K 4.9 03/27/2023    CO2 28.0 03/27/2023     03/27/2023    BUN 10 03/27/2023    CREATININE 1.04 03/27/2023    BCR 9.6 03/27/2023    ANIONGAP 5.0 03/27/2023       Lab Results   Component Value Date    WBC 7.37 04/17/2023    HGB 15.6 04/17/2023    HCT 45.1 04/17/2023    MCV 88 04/17/2023     04/17/2023     Radiographic Studies  No Images in the past 120 days found..    I have reviewed the above labs and imaging.     Assessment / Plan      Assessment/Plan: Mr. Montanez presented for his scheduled follow up to evaluate daily Cialis for LUTS.  He reports that symptoms are not much improved and he continues to experience urinary frequency and urgency.  Physical exam negative for prostatitis symptoms.  UA negative for blood, nitrites and leukocytes.  We discussed that I don't believe symptoms are related to prostatitis.     Plan is as follows  1) increase fluid intake to at least 64 ounces per day with the majority being water. Decrease bladder stimulating foods and fluids.    2) may continue on Cialis, as ED is improved.    3) start Gemtesa; side effects discussed.  4) declined PFPT at this time but educated on Kegel exercises.    5) stop fluids at least 2 hours prior to bed.  Try to postpone urination for 5-10 minutes after urge is felt.       Diagnoses and all orders for this visit:    1. Overactive bladder (Primary)  -     Vibegron (Gemtesa) 75 MG tablet; Take 1 tablet by mouth Daily.  Dispense: 30 tablet; Refill: 5  -     POC Urinalysis Dipstick, Automated      Follow Up:   Return for 6-8 weeks for medication check .      Thi Carey APRN, MSN, FNP-C  [facility]

## 2024-06-14 ENCOUNTER — OFFICE VISIT (OUTPATIENT)
Dept: UROLOGY | Facility: CLINIC | Age: 64
End: 2024-06-14

## 2024-06-14 VITALS
RESPIRATION RATE: 14 BRPM | OXYGEN SATURATION: 96 % | BODY MASS INDEX: 28.5 KG/M2 | SYSTOLIC BLOOD PRESSURE: 138 MMHG | WEIGHT: 182 LBS | HEART RATE: 56 BPM | TEMPERATURE: 97.6 F | DIASTOLIC BLOOD PRESSURE: 90 MMHG

## 2024-06-14 DIAGNOSIS — N32.81 OVERACTIVE BLADDER: Primary | ICD-10-CM

## 2024-06-14 DIAGNOSIS — C61 PROSTATE CANCER: ICD-10-CM

## 2024-06-14 NOTE — PROGRESS NOTES
Office Visit Established Male Patient     Patient Name: Cheikh Montanez  : 1960   MRN: [id number]     Chief Complaint:   Chief Complaint   Patient presents with    overactive bladder    Follow-up     6 week follow up appointment to check medications        History of Present Illness: Mr. Cheikh Montanez is a 63 y.o. male who presents today with history of intermediate risk prostate cancer s/p SBRT 1 year ago with LUTS.  He trialed daily Cialis without much improvement in symptoms but did notice improvement in his ED.  He was started on Gemtesa at his last visit and is here today to evaluate treatment response.      He reports that he has drastically decreased his soda intake to 1 soda or less per day. Drinking 4 bottles of water per day.  Has noticed symptom improvement on Gemtesa and would like to continue taking this medication.  PSA due to be checked in September. Patient prescribed nitroglycerin but does not take.          Subjective      Review of System: ROS reviewed by me and is negative unless otherwise noted in HPI.       Past Medical History:   Past Medical History:   Diagnosis Date    Coronary artery disease     2 cardiac stents- - Dr[facility]jesus[facility]    Heart attack 2016    Hypertension     Prostate cancer     PVD (peripheral vascular disease)      Past Surgical History:   Past Surgical History:   Procedure Laterality Date    CARDIAC CATHETERIZATION      CORONARY ANGIOPLASTY WITH STENT PLACEMENT      X 2     CYBERKNIFE  2023    Prostate/SV    HERNIA REPAIR      PROSTATE FIDUCIAL MARKER PLACEMENT N/A 2023    Procedure: PROSTATE FIDUCIAL MARKER PLACEMENT;  Surgeon: Ankush Nye MD;  Location: [facility];  Service: Urology;  Laterality: N/A;    PROSTATE FIDUCIAL MARKER PLACEMENT       Family History:   Family History   Problem Relation Age of Onset    Breast cancer Mother     Lung cancer Mother     Lung cancer Father     Prostate cancer Father      Stomach cancer Niece      Social History:   Social History     Socioeconomic History    Marital status:    Tobacco Use    Smoking status: Former     Passive exposure: Never    Smokeless tobacco: Former    Tobacco comments:     Quit 2015   Vaping Use    Vaping status: Never Used   Substance and Sexual Activity    Alcohol use: Not Currently    Drug use: Never    Sexual activity: Defer     Medications:     Current Outpatient Medications:     aspirin 81 MG EC tablet, Take 1 tablet by mouth Daily., Disp: , Rfl:     Cholecalciferol (Vitamin D3) 1.25 MG (06901 UT) capsule, Take 1 capsule by mouth 1 (One) Time Per Week., Disp: , Rfl:     metoprolol succinate XL (TOPROL-XL) 25 MG 24 hr tablet, Take 0.5 tablets by mouth Daily., Disp: , Rfl:     rosuvastatin (CRESTOR) 20 MG tablet, Take 1 tablet by mouth Daily., Disp: , Rfl:     tadalafil (Cialis) 5 MG tablet, Take 1 tablet by mouth Daily., Disp: 30 tablet, Rfl: 11    Vibegron (Gemtesa) 75 MG tablet, Take 1 tablet by mouth Daily., Disp: 30 tablet, Rfl: 5    nitroglycerin (NITROSTAT) 0.4 MG SL tablet, Place 1 tablet under the tongue. (Patient not taking: Reported on 4/30/2024), Disp: , Rfl:     Allergies:   No Known Allergies    Objective     Physical Exam:   Vital Signs:   Vitals:    06/14/24 0952   BP: 138/90   BP Location: Left arm   Patient Position: Sitting   Cuff Size: Adult   Pulse: 56   Resp: 14   Temp: 97.6 °F (36.4 °C)   TempSrc: Temporal   SpO2: 96%   Weight: 82.6 kg (182 lb)     Body mass index is 28.5 kg/m².   Physical Exam  Vitals and nursing note reviewed.   Constitutional:       General: He is not in acute distress.     Appearance: Normal appearance. He is not ill-appearing.   HENT:      Head: Normocephalic and atraumatic.   Pulmonary:      Effort: Pulmonary effort is normal.   Skin:     General: Skin is warm and dry.   Neurological:      General: No focal deficit present.      Mental Status: He is alert and oriented to person, place, and time.    Psychiatric:         Mood and Affect: Mood normal.         Behavior: Behavior normal.     Labs  Brief Urine Lab Results  (Last result in the past 365 days)        Color   Clarity   Blood   Leuk Est   Nitrite   Protein   CREAT   Urine HCG        04/30/24 1108 Dark Yellow   Clear   Negative   Negative   Negative   Trace                   Lab Results   Component Value Date    GLUCOSE 115 (H) 03/27/2023    CALCIUM 9.3 03/27/2023     03/27/2023    K 4.9 03/27/2023    CO2 28.0 03/27/2023     03/27/2023    BUN 10 03/27/2023    CREATININE 1.04 03/27/2023    BCR 9.6 03/27/2023    ANIONGAP 5.0 03/27/2023       Lab Results   Component Value Date    WBC 7.37 04/17/2023    HGB 15.6 04/17/2023    HCT 45.1 04/17/2023    MCV 88 04/17/2023     04/17/2023     Radiographic Studies  No Images in the past 120 days found.    I have reviewed the above labs and imaging.     Assessment / Plan      Assessment/Plan:  Mr. Cheikh Montanez is a 63 y.o. male who presents today with history of intermediate risk prostate cancer s/p SBRT 1 year ago with LUTS.  He trialed daily Cialis without much improvement in symptoms but did notice improvement in his ED.  He is pleased with symptoms on Gemtesa.  He has also noticed improvement since increasing water intake and decreasing his soda intake.       Plan as follows  - continue Gemtesa.  Provided him with 2 weeks of samples today while he's awaiting insurance approval.    -continue drinking water and limiting soda intake.    -plan to repeat PSA in September and follow up with Mr. Montanez in December.  Order is in for PSA.  Patient scheduled to follow up with Dr. Domínguez in July.    -Plan to review PSA, IPSS, and PVR at next visit.      Diagnoses and all orders for this visit:    1. Overactive bladder (Primary)    2. Prostate cancer    Follow Up:   Return in about 6 months (around 12/14/2024) for Next scheduled follow up.      Thi Carey, APRN, MSN, FNP-C  [facility]  [place]

## 2024-07-02 ENCOUNTER — TELEPHONE (OUTPATIENT)
Dept: UROLOGY | Facility: CLINIC | Age: 64
End: 2024-07-02

## 2024-07-02 DIAGNOSIS — N32.81 OVERACTIVE BLADDER: ICD-10-CM

## 2024-07-02 RX ORDER — VIBEGRON 75 MG/1
75 TABLET, FILM COATED ORAL DAILY
Qty: 30 TABLET | Refills: 5 | Status: SHIPPED | OUTPATIENT
Start: 2024-07-02 | End: 2024-07-03

## 2024-07-02 NOTE — TELEPHONE ENCOUNTER
Caller: BEBO CABRERA    Relationship: Emergency Contact    Best call back number: [phone]    Which medication are you concerned about: GEMTESA    Who prescribed you this medication: VICTORINA EID    When did you start taking this medication: SAMPLES-6 WEEKS    What are your concerns: PT NOW HAS MEDICAID FOR INSURANCE. MEDICAID WILL NOT COVER THE COST OF  THE MEDICINE. PHARMACY THINKS THAT IF YOU RESUBMIT IT COULD POSSIBLY COVER. COULD YOU RESUBMIT TO PHARMACY? HE HAS BEEN OUT OF MEDICINE FOR ABOUT 3 DAYS. IT HAS REALLY HELPED HIM. PLEASE CALL WITH ANY CONCERNS. THANK YOU

## 2024-07-03 ENCOUNTER — TELEPHONE (OUTPATIENT)
Dept: UROLOGY | Facility: CLINIC | Age: 64
End: 2024-07-03
Payer: MEDICAID

## 2024-07-03 DIAGNOSIS — N32.81 OVERACTIVE BLADDER: Primary | ICD-10-CM

## 2024-07-03 RX ORDER — MIRABEGRON 25 MG/1
25 TABLET, FILM COATED, EXTENDED RELEASE ORAL DAILY
Qty: 30 TABLET | Refills: 2 | Status: SHIPPED | OUTPATIENT
Start: 2024-07-03

## 2024-07-03 NOTE — TELEPHONE ENCOUNTER
Patient contacted regarding insurance denial of Gemtesa.  I have discussed that I am discontinuing Gemtesa at this time and will start him on Myrbetriq 25 mg once daily.  I have asked him to check his BP once daily at random times and keep a blood pressure log.  Patient plans to vacation in late July to early August.  Will follow up with Mr. Montanez at the end of August.  Mr. Montanez encouraged him to follow up with me sooner should the need arise.      ----- Message from Salena BARAJAS sent at 7/3/2024  3:53 PM EDT -----  Regarding: gemtesa denial  Denied on June 26  This request has not been approved. Based on the information sent for review, the requested drug did not meet our guideline rules. To get the request approved, your doctor needs to show that you have met the guideline rules below. If you have questions, please call your doctor. In some cases, the requested drug or alternatives offered may have other guideline rules that need to be met. Our guideline named [employer] requires the following rule(s) be met for approval: The member has had greater than or equal to 1-month trial and failure [drug did not work] to Myrbetriq.Your doctor told us you have a diagnosis of overactive bladder (increase in amount and frequency of urination). We do not have records or chart notes showing you have had a one-month trial with Myrbetriq as listed above. This is why your request is denied. Please work with your provider to use a different medication or get us more information if it will allow us to approve this request. A written notification letter will follow with additional details.

## 2024-07-08 NOTE — TELEPHONE ENCOUNTER
Caller: BEBO CABRERA     Relationship: SELF    Best call back number: [phone]    What is the best time to reach you: ANY    Who are you requesting to speak with (clinical staff, provider,  specific staff member): VICTORINA OR STAFF    Do you know the name of the person who called: WIFE CALLED OFFICE    What was the call regarding: WIFE CALLED STATING THAT PHARMACY IS FAXING INFO REGARDING MYRBETRIQ. WIFE STATES PAT IS OUT AND NEED TO BE ABLE TO GET ASAP. STATES THAT A PA MIGHT BE NEEDED. PLEASE CALL BACK TO DISCUSS. CAN PT COME IN TO GET SAMPLES IF NEEDED? THANK YOU.

## 2024-07-11 ENCOUNTER — OFFICE VISIT (OUTPATIENT)
Dept: CARDIOLOGY | Facility: CLINIC | Age: 64
End: 2024-07-11
Payer: COMMERCIAL

## 2024-07-11 VITALS
BODY MASS INDEX: 28.35 KG/M2 | SYSTOLIC BLOOD PRESSURE: 145 MMHG | WEIGHT: 180.6 LBS | HEIGHT: 67 IN | OXYGEN SATURATION: 96 % | DIASTOLIC BLOOD PRESSURE: 88 MMHG | HEART RATE: 54 BPM

## 2024-07-11 DIAGNOSIS — I25.10 ASCVD (ARTERIOSCLEROTIC CARDIOVASCULAR DISEASE): Primary | ICD-10-CM

## 2024-07-11 DIAGNOSIS — E78.5 DYSLIPIDEMIA: ICD-10-CM

## 2024-07-11 DIAGNOSIS — I10 ESSENTIAL HYPERTENSION: ICD-10-CM

## 2024-07-11 PROCEDURE — 99214 OFFICE O/P EST MOD 30 MIN: CPT | Performed by: PHYSICIAN ASSISTANT

## 2024-07-11 PROCEDURE — 3077F SYST BP >= 140 MM HG: CPT | Performed by: PHYSICIAN ASSISTANT

## 2024-07-11 PROCEDURE — 3079F DIAST BP 80-89 MM HG: CPT | Performed by: PHYSICIAN ASSISTANT

## 2024-07-11 PROCEDURE — 93000 ELECTROCARDIOGRAM COMPLETE: CPT | Performed by: PHYSICIAN ASSISTANT

## 2024-07-11 NOTE — PROGRESS NOTES
Devin Major, TYESHA  Cheikh Montanez  1960  07/11/2024    Patient Active Problem List   Diagnosis    Prostate cancer    Essential hypertension    ASCVD (arteriosclerotic cardiovascular disease)    COPD, mild    Chronic superficial venous thrombosis of lower extremity, right    Chronic venous insufficiency of lower extremity    Dyslipidemia    Hypercholesterolemia    Atherosclerotic cardiovascular disease    Symptomatic varicose veins of right lower extremity       Dear Devin Major, APRN:    Subjective     Follow-up  :    Chief Complaint   Patient presents with    Follow-up     routine       Cheikh Montanez is a pleasant 63 y.o. male with a past medical history significant for ASCVD status post PCI at [facility] in 2015 with exact details unknown he reportedly had a 90% stenosis that was left for staged PCI however he was asymptomatic and thus has been treated with GDMT since.  He does also have history of tobacco abuse but did quit in 2015, essential hypertension, and dyslipidemia.  She comes in for routine follow up.     Cheikh has no complaints with open questioning.  His blood pressure is borderline elevated today but he reports that he is about to start on an new medication for prostate and he was advised that this can lower blood pressure and was advised to start checking blood pressure twice daily at home.  Clinically he denies any chest pains, shortness of breath, dizziness, or syncope.    No Known Allergies:      Current Outpatient Medications:     aspirin 81 MG EC tablet, Take 1 tablet by mouth Daily., Disp: , Rfl:     Cholecalciferol (Vitamin D3) 1.25 MG (21795 UT) capsule, Take 1 capsule by mouth 1 (One) Time Per Week., Disp: , Rfl:     metoprolol succinate XL (TOPROL-XL) 25 MG 24 hr tablet, Take 0.5 tablets by mouth Daily., Disp: , Rfl:     nitroglycerin (NITROSTAT) 0.4 MG SL tablet, Place 1 tablet under the tongue., Disp: , Rfl:     rosuvastatin (CRESTOR) 20  "MG tablet, Take 1 tablet by mouth Daily., Disp: , Rfl:     tadalafil (Cialis) 5 MG tablet, Take 1 tablet by mouth Daily., Disp: 30 tablet, Rfl: 11    Mirabegron ER (Myrbetriq) 25 MG tablet sustained-release 24 hour 24 hr tablet, Take 1 tablet by mouth Daily. (Patient not taking: Reported on 7/11/2024), Disp: 30 tablet, Rfl: 2    The following portions of the patient's history were reviewed and updated as appropriate: allergies, current medications, past family history, past medical history, past social history, past surgical history and problem list.    Social History     Tobacco Use    Smoking status: Former     Passive exposure: Never    Smokeless tobacco: Former    Tobacco comments:     Quit 2015   Vaping Use    Vaping status: Never Used   Substance Use Topics    Alcohol use: Not Currently    Drug use: Never         Objective   Vitals:    07/11/24 0848   BP: 145/88   Pulse: 54   SpO2: 96%   Weight: 81.9 kg (180 lb 9.6 oz)   Height: 170.2 cm (67\")     Body mass index is 28.29 kg/m².    ROS    Constitutional:       General: Not in acute distress.     Appearance: Healthy appearance. Well-developed and not in distress. Not diaphoretic.   Eyes:      Conjunctiva/sclera: Conjunctivae normal.      Pupils: Pupils are equal, round, and reactive to light.   HENT:      Head: Normocephalic and atraumatic.   Neck:      Vascular: No carotid bruit or JVD.   Pulmonary:      Effort: Pulmonary effort is normal. No respiratory distress.      Breath sounds: Normal breath sounds.   Cardiovascular:      Normal rate. Regular rhythm.   Edema:     Peripheral edema absent.   Skin:     General: Skin is cool.   Neurological:      Mental Status: Alert, oriented to person, place, and time and oriented to person, place and time.         Lab Results   Component Value Date     03/27/2023    K 4.9 03/27/2023     03/27/2023    CO2 28.0 03/27/2023    BUN 10 03/27/2023    CREATININE 1.04 03/27/2023    GLUCOSE 115 (H) 03/27/2023    CALCIUM " "9.3 03/27/2023     No results found for: \"CKTOTAL\"  Lab Results   Component Value Date    WBC 7.37 04/17/2023    HGB 15.6 04/17/2023    HCT 45.1 04/17/2023     04/17/2023     No results found for: \"INR\"  No results found for: \"MG\"  Lab Results   Component Value Date    TSH 0.484 10/27/2023    PSA 1.6 03/19/2024      No results found for: \"BNP\"    During this visit the following were done:  Labs Reviewed []    Labs Ordered []    Radiology Reports Reviewed []    Radiology Ordered []    PCP Records Reviewed []    Referring Provider Records Reviewed []    ER Records Reviewed []    Hospital Records Reviewed []    History Obtained From Family []    Radiology Images Reviewed []    Other Reviewed []    Records Requested []         ECG 12 Lead    Date/Time: 7/11/2024 8:50 AM  Performed by: Ilya Hartman PA-C    Authorized by: Ilya Hartman PA-C  Comparison: compared with previous ECG   Similar to previous ECG  Rhythm: sinus rhythm  Conduction: conduction normal  ST Segments: ST segments normal    Clinical impression: normal ECG        Assessment & Plan    Diagnosis Plan   1. ASCVD (arteriosclerotic cardiovascular disease)        2. Dyslipidemia        3. Essential hypertension                 Recommendations:  ASCVD  Denies any recent anginal symptoms doing well clinically continue aspirin, metoprolol, and Crestor.  Essential hypertension  Borderline elevated but he reports he is about to start a medication for his prostate that can lower blood pressure will see how this affects his blood pressure before adding additional antihypertensives.  He reports he is going to start checking blood pressure daily at home I do suspect his blood pressure may be better controlled also.  Dyslipidemia  Continue Crestor    No follow-ups on file.    As always, I appreciate very much the opportunity to participate in the cardiovascular care of your patients.      With Best Regards,    Ilya Hartman PA-C          "

## 2024-07-26 ENCOUNTER — HOSPITAL ENCOUNTER (OUTPATIENT)
Dept: RADIATION ONCOLOGY | Facility: HOSPITAL | Age: 64
Setting detail: RADIATION/ONCOLOGY SERIES
Discharge: HOME | End: 2024-07-26
Payer: COMMERCIAL

## 2024-07-26 ENCOUNTER — OFFICE VISIT (OUTPATIENT)
Dept: RADIATION ONCOLOGY | Facility: HOSPITAL | Age: 64
End: 2024-07-26
Payer: COMMERCIAL

## 2024-07-26 DIAGNOSIS — C61 PROSTATE CANCER: Primary | ICD-10-CM

## 2024-07-26 NOTE — PROGRESS NOTES
TELEMEDICINE FOLLOW-UP NOTE    07/26/2024    Problem List Items Addressed This Visit          Other    Prostate cancer - Primary    Cancer Staging   Stage IIB (cT1c, cN0, cM0, PSA: 18.1, Grade Group: 2)    Time Devoted to Visit: 10 min including time to review his previous imaging and records, with 50% devoted to direct discussion.    Reason for Visit:  I personally contacted Cheikh Montanez  today in an effort to perform their scheduled follow-up visit remotely.    SUBJECTIVE:  With respect to their cancer diagnosis, Mr. Montanez has a history of an unfavorable intermediate risk prostate cancer, which appeared to arise in the setting of chronic prostatitis given his elevated PSA.  He was treated with CyberKnife radiosurgery and he completed treatments in June 2023.  He initially responded to treatment quite favorably and his PSA reduced down to 1.4 NG/mL by September, and his most recent PSA in March 2024 was 1.6 ng/ml.  He has been very pleased, although he has continued to experience urgency, nocturia 2-3 times nightly, as well as reduced durability with erections.  He was started on Myrbetriq earlier this year which seemed to help, although due to his insurance some of his medications required adjustment and changing due to coverage.  Overall he feels like he is headed in the right direction and his symptoms are steadily getting better. He denies fevers, chills, or other symptoms of pain.  He denies any gastrointestinal symptoms.             EXAM FINDINGS AND/OR PROBLEMS:  No new exam findings or problems    LABORATORY      ASSESSMENT/PLAN: Cheikh Montanez is a 63 y.o. year old male with a history of an unfavorable, intermediate risk prostate cancer.  He is just over 1 year out from completion of therapy, he appears to be doing better clinically as his urine symptoms are steadily improving.  He is due to have a repeat PSA and this will be done next month when he sees Dr. Nye with urology again.   I anticipate that his lower urinary tract symptoms may actually begin to improve over the next year as he developed some scarring in the prostate from treatment.  I will plan to see him back in 1 year or sooner should symptoms warrant.    RECOMMENDATIONS:  Return in about 1 year (around 7/26/2025) for Office Visit.    John Domínguez MD

## 2024-08-02 ENCOUNTER — TELEPHONE (OUTPATIENT)
Dept: UROLOGY | Facility: CLINIC | Age: 64
End: 2024-08-02

## 2024-08-02 NOTE — TELEPHONE ENCOUNTER
Provider will be out of the office on 8/30/2024. Rescheduled the patient to 9/5/2024. VM full. Hub can read.

## 2024-09-05 ENCOUNTER — OFFICE VISIT (OUTPATIENT)
Dept: UROLOGY | Facility: CLINIC | Age: 64
End: 2024-09-05
Payer: COMMERCIAL

## 2024-09-05 VITALS
RESPIRATION RATE: 12 BRPM | TEMPERATURE: 97 F | SYSTOLIC BLOOD PRESSURE: 150 MMHG | HEART RATE: 55 BPM | WEIGHT: 181.2 LBS | BODY MASS INDEX: 28.38 KG/M2 | DIASTOLIC BLOOD PRESSURE: 104 MMHG | OXYGEN SATURATION: 98 %

## 2024-09-05 DIAGNOSIS — N32.81 OVERACTIVE BLADDER: ICD-10-CM

## 2024-09-05 DIAGNOSIS — N52.35 ERECTILE DYSFUNCTION FOLLOWING RADIATION THERAPY: ICD-10-CM

## 2024-09-05 DIAGNOSIS — C61 PROSTATE CANCER: Primary | ICD-10-CM

## 2024-09-05 RX ORDER — MIRABEGRON 50 MG/1
50 TABLET, EXTENDED RELEASE ORAL DAILY
Qty: 30 TABLET | Refills: 3 | Status: SHIPPED | OUTPATIENT
Start: 2024-09-05

## 2024-09-05 NOTE — PROGRESS NOTES
Office Visit Established Male Patient     Patient Name: Cheikh Montanez  : 1960   MRN: [id number]     Chief Complaint:   Chief Complaint   Patient presents with    overactive bladder    Follow-up       History of Present Illness: Mr. Cheikh Montanez is a 63 y.o. male with history of PCa s/p SBRT 1.5 years ago, chronic LUTS, OAB, and ED who presents today for follow up with to discuss Myrbetriq efficacy for OAB.  Most recent PSA has remained stable at 1.8 ng/mL.       He states that he as some good days and some bad and notices increased urgency and frequency.  This is typically improved with an 800 mg Ibuprofen.  He reports that BP at home 100-120/70-80.  It has been elevated 2 times at home but resolved quickly.  He plans to upload his BP log into epic for my review.  He is otherwise feeling well.    Subjective      Review of System: ROS reviewed by me and is negative unless otherwise noted in HPI.      Past Medical History:   Past Medical History:   Diagnosis Date    Coronary artery disease     2 cardiac stents- - Dr. Dejesus- [facility]    Heart attack 2016    Hypertension     Prostate cancer     PVD (peripheral vascular disease)        Past Surgical History:   Past Surgical History:   Procedure Laterality Date    CARDIAC CATHETERIZATION      CORONARY ANGIOPLASTY WITH STENT PLACEMENT      X 2     CYBERKNIFE  2023    Prostate/SV    HERNIA REPAIR      PROSTATE FIDUCIAL MARKER PLACEMENT N/A 2023    Procedure: PROSTATE FIDUCIAL MARKER PLACEMENT;  Surgeon: Ankush Nye MD;  Location: [facility] [place];  Service: Urology;  Laterality: N/A;    PROSTATE FIDUCIAL MARKER PLACEMENT         Family History:   Family History   Problem Relation Age of Onset    Breast cancer Mother     Lung cancer Mother     Lung cancer Father     Prostate cancer Father     Stomach cancer Niece        Social History:   Social History     Socioeconomic History    Marital status:    Tobacco  Use    Smoking status: Former     Passive exposure: Never    Smokeless tobacco: Former    Tobacco comments:     Quit 2015   Vaping Use    Vaping status: Never Used   Substance and Sexual Activity    Alcohol use: Not Currently    Drug use: Never    Sexual activity: Defer       Medications:     Current Outpatient Medications:     aspirin 81 MG EC tablet, Take 1 tablet by mouth Daily., Disp: , Rfl:     metoprolol succinate XL (TOPROL-XL) 25 MG 24 hr tablet, Take 0.5 tablets by mouth Daily., Disp: , Rfl:     rosuvastatin (CRESTOR) 20 MG tablet, Take 1 tablet by mouth Daily., Disp: , Rfl:     tadalafil (Cialis) 5 MG tablet, Take 1 tablet by mouth Daily., Disp: 30 tablet, Rfl: 11    Cholecalciferol (Vitamin D3) 1.25 MG (24568 UT) capsule, Take 1 capsule by mouth 1 (One) Time Per Week. (Patient not taking: Reported on 9/5/2024), Disp: , Rfl:     Mirabegron ER (MYRBETRIQ) 50 MG tablet sustained-release 24 hour 24 hr tablet, Take 50 mg by mouth Daily., Disp: 30 tablet, Rfl: 3    nitroglycerin (NITROSTAT) 0.4 MG SL tablet, Place 1 tablet under the tongue. (Patient not taking: Reported on 9/5/2024), Disp: , Rfl:     Allergies:   No Known Allergies    Objective     Physical Exam:   Vital Signs:   Vitals:    09/05/24 1009   BP: (!) 150/104   BP Location: Left arm   Patient Position: Sitting   Cuff Size: Adult   Pulse: 55   Resp: 12   Temp: 97 °F (36.1 °C)   TempSrc: Temporal   SpO2: 98%   Weight: 82.2 kg (181 lb 3.2 oz)     Body mass index is 28.38 kg/m².   Physical Exam  Vitals and nursing note reviewed.   Constitutional:       General: He is not in acute distress.     Appearance: Normal appearance. He is not ill-appearing.   HENT:      Head: Normocephalic and atraumatic.   Pulmonary:      Effort: Pulmonary effort is normal.   Skin:     General: Skin is warm and dry.   Neurological:      General: No focal deficit present.      Mental Status: He is alert and oriented to person, place, and time.   Psychiatric:         Mood and  Affect: Mood normal.         Behavior: Behavior normal.     Labs  Brief Urine Lab Results  (Last result in the past 365 days)        Color   Clarity   Blood   Leuk Est   Nitrite   Protein   CREAT   Urine HCG        04/30/24 1108 Dark Yellow   Clear   Negative   Negative   Negative   Trace                   Lab Results   Component Value Date    GLUCOSE 115 (H) 03/27/2023    CALCIUM 9.3 03/27/2023     03/27/2023    K 4.9 03/27/2023    CO2 28.0 03/27/2023     03/27/2023    BUN 10 03/27/2023    CREATININE 1.04 03/27/2023    BCR 9.6 03/27/2023    ANIONGAP 5.0 03/27/2023       Lab Results   Component Value Date    WBC 7.37 04/17/2023    HGB 15.6 04/17/2023    HCT 45.1 04/17/2023    MCV 88 04/17/2023     04/17/2023            Radiographic Studies  No Images in the past 120 days found..    I have reviewed the above labs and imaging.     Assessment / Plan      Assessment/Plan: Mr. Cheikh Montanez is a 63 y.o. male with history of PCa s/p SBRT 1.5 years ago, chronic LUTS, OAB, and ED who presents today for follow up with to discuss Myrbetriq efficacy for OAB.  Most recent PSA has remained stable at 1.8 ng/mL.      We discussed options for overactive bladder and urgency including bladder botox.  We also discussed that some of his symptoms may be related to persistent radiation cystitis. Discussed bladder cocktails.  He is not interested in trying these at this time.  Will increase his Myrbetriq with the understanding that he is to call me if BP remains elevated or if it is elevated for numerous occasions.  He will recheck his BP at home.  He thinks there is some element of white coat hypertension.      Will follow up with Mr. Montanez in 3 months with repeat PSA.  He is to follow up with me earlier should the need arise.  Questions/concerns addressed.   Diagnoses and all orders for this visit:    1. Prostate cancer (Primary)  -     PSA DIAGNOSTIC; Future    2. Erectile dysfunction following radiation  therapy    3. Overactive bladder  -     Mirabegron ER (MYRBETRIQ) 50 MG tablet sustained-release 24 hour 24 hr tablet; Take 50 mg by mouth Daily.  Dispense: 30 tablet; Refill: 3      Follow Up:   Return in about 3 months (around 12/5/2024) for Next scheduled follow up, with Thi .    TYESHA Paul, MSN, FNP-C  [facility] [place]

## 2024-12-06 ENCOUNTER — OFFICE VISIT (OUTPATIENT)
Dept: UROLOGY | Facility: CLINIC | Age: 64
End: 2024-12-06
Payer: COMMERCIAL

## 2024-12-06 VITALS
TEMPERATURE: 97.6 F | RESPIRATION RATE: 14 BRPM | BODY MASS INDEX: 28.35 KG/M2 | HEIGHT: 67 IN | HEART RATE: 66 BPM | WEIGHT: 180.6 LBS | OXYGEN SATURATION: 99 %

## 2024-12-06 DIAGNOSIS — N32.81 OVERACTIVE BLADDER: ICD-10-CM

## 2024-12-06 DIAGNOSIS — C61 PROSTATE CANCER: Primary | ICD-10-CM

## 2024-12-06 DIAGNOSIS — N52.35 ERECTILE DYSFUNCTION FOLLOWING RADIATION THERAPY: ICD-10-CM

## 2024-12-06 RX ORDER — MIRABEGRON 50 MG/1
50 TABLET, FILM COATED, EXTENDED RELEASE ORAL DAILY
Qty: 90 TABLET | Refills: 3 | Status: SHIPPED | OUTPATIENT
Start: 2024-12-06

## 2024-12-06 NOTE — PROGRESS NOTES
Office Visit Established Male Patient     Patient Name: Cheikh Montanez  : 1960   MRN: [id number]     Chief Complaint:   Chief Complaint   Patient presents with    Prostate Cancer    Follow-up     History of Present Illness: Mr. Cheikh Montanez is a 63 y.o. male with history of PCa s/p SBRT 1.5 years ago, chronic LUTS, OAB on Myrbetriq, and ED who presents today for follow up to review his PSA.  PSA has declined to 1.0 ng/mL.  He is overall feeling well today. Pleased with urinary symptoms on Myrbetriq.  No new urologic complaints.      Subjective      Review of System: ROS reviewed by me and is negative unless otherwise noted in HPI.      Past Medical History:   Past Medical History:   Diagnosis Date    Cor[facility]ary artery disease     [facility]    [facility]    Hypertension     Prostate cancer     PVD (peripheral vascular disease)        Past Surgical History:   Past Surgical History:   Procedure Laterality Date    CARDIAC CATHETERIZATION      CORONARY ANGIOPLASTY WITH STENT PLACEMENT      X 2     CYBERKNIFE  2023    Prostate/SV    HERNIA REPAIR      PROSTATE FIDUCIAL MARKER PLACEMENT N/A 2023    Procedure: PROSTATE FIDUCIAL MARKER PLACEMENT;  Surgeon: Ankush Nye MD;  Location: [facility];  Service: Urology;  Laterality: N/A;    PROSTATE FIDUCIAL MARKER PLACEMENT         Family History:   Family History   Problem Relation Age of Onset    Breast cancer Mother     Lung cancer Mother     Lung cancer Father     Prostate cancer Father     Stomach cancer Niece        Social History:   Social History     Socioeconomic History    Marital status:    Tobacco Use    Smoking status: Former     Passive exposure: Never    Smokeless tobacco: Former    Tobacco comments:     Quit    Vaping Use    Vaping status: Never Used   Substance and Sexual Activity    Alcohol use: Not Currently    Drug use: Never    Sexual activity:  "Defer     Medications:     Current Outpatient Medications:     aspirin 81 MG EC tablet, Take 1 tablet by mouth Daily., Disp: , Rfl:     metoprolol succinate XL (TOPROL-XL) 25 MG 24 hr tablet, Take 0.5 tablets by mouth Daily., Disp: , Rfl:     Mirabegron ER (MYRBETRIQ) 50 MG tablet sustained-release 24 hour 24 hr tablet, Take 50 mg by mouth Daily., Disp: 90 tablet, Rfl: 3    rosuvastatin (CRESTOR) 20 MG tablet, Take 1 tablet by mouth Daily., Disp: , Rfl:     tadalafil (Cialis) 5 MG tablet, Take 1 tablet by mouth Daily., Disp: 30 tablet, Rfl: 11    Cholecalciferol (Vitamin D3) 1.25 MG (20077 UT) capsule, Take 1 capsule by mouth 1 (One) Time Per Week. (Patient not taking: Reported on 12/6/2024), Disp: , Rfl:     nitroglycerin (NITROSTAT) 0.4 MG SL tablet, Place 1 tablet under the tongue. (Patient not taking: Reported on 12/6/2024), Disp: , Rfl:     Allergies:   No Known Allergies    Objective     Physical Exam:   Vital Signs:   Vitals:    12/06/24 0946   Pulse: 66   Resp: 14   Temp: 97.6 °F (36.4 °C)   TempSrc: Temporal   SpO2: 99%   Weight: 81.9 kg (180 lb 9.6 oz)   Height: 170.2 cm (67\")       Body mass index is 28.29 kg/m².   Physical Exam  Vitals and nursing note reviewed.   Constitutional:       General: He is not in acute distress.     Appearance: Normal appearance. He is not ill-appearing.   HENT:      Head: Normocephalic and atraumatic.   Pulmonary:      Effort: Pulmonary effort is normal.   Skin:     General: Skin is warm and dry.   Neurological:      General: No focal deficit present.      Mental Status: He is alert and oriented to person, place, and time.   Psychiatric:         Mood and Affect: Mood normal.         Behavior: Behavior normal.     Labs  Brief Urine Lab Results  (Last result in the past 365 days)        Color   Clarity   Blood   Leuk Est   Nitrite   Protein   CREAT   Urine HCG        04/30/24 1108 Dark Yellow   Clear   Negative   Negative   Negative   Trace                 Lab Results "   Component Value Date    GLUCOSE 115 (H) 03/27/2023    CALCIUM 9.3 03/27/2023     03/27/2023    K 4.9 03/27/2023    CO2 28.0 03/27/2023     03/27/2023    BUN 10 03/27/2023    CREATININE 1.04 03/27/2023    BCR 9.6 03/27/2023    ANIONGAP 5.0 03/27/2023       Lab Results   Component Value Date    WBC 7.37 04/17/2023    HGB 15.6 04/17/2023    HCT 45.1 04/17/2023    MCV 88 04/17/2023     04/17/2023     Radiographic Studies  No Images in the past 120 days found.    I have reviewed the above labs and imaging.     Assessment / Plan      Assessment/Plan: Mr. Cheikh Montanez is a 63 y.o. male with history of PCa s/p SBRT 1.5 years ago, chronic LUTS, OAB on Myrbetriq, and ED who presents today for follow up to review his PSA.  PSA has declined to 1.0 ng/mL.  He is overall feeling well today. Pleased with urinary symptoms on Myrbetriq.  No new urologic complaints.      Plan to continue Myrbetriq.  Continue to monitor blood pressure.  Continue Tadalafil.  We discussed that PSA should continue to decline or remain stable.  A clinically significant rise in PSA would be 2 above william or 3 consecutive increases.  I anticipate that his PSA in 6 months will continue to decline.      Will have Mr. Montanez follow up with me in 6 months with PSA.  Encouraged to follow up earlier should the need arise.      Diagnoses and all orders for this visit:    1. Prostate cancer (Primary)  -     PSA Diagnostic; Future    2. Erectile dysfunction following radiation therapy    3. Overactive bladder  -     Mirabegron ER (MYRBETRIQ) 50 MG tablet sustained-release 24 hour 24 hr tablet; Take 50 mg by mouth Daily.  Dispense: 90 tablet; Refill: 3    Follow Up:   Return in about 6 months (around 6/6/2025) for Next scheduled follow up, with Thi with PSA done prior. .    Thi Carey, APRN, MSN, FNP-C  [facility] [place]

## 2025-01-03 DIAGNOSIS — C61 PROSTATE CANCER: ICD-10-CM

## 2025-01-03 RX ORDER — TADALAFIL 5 MG/1
5 TABLET ORAL DAILY
Qty: 30 TABLET | Refills: 0 | OUTPATIENT
Start: 2025-01-03

## 2025-01-06 DIAGNOSIS — C61 PROSTATE CANCER: ICD-10-CM

## 2025-01-07 DIAGNOSIS — N52.35 ERECTILE DYSFUNCTION FOLLOWING RADIATION THERAPY: Primary | ICD-10-CM

## 2025-01-07 DIAGNOSIS — R39.9 LOWER URINARY TRACT SYMPTOMS (LUTS): ICD-10-CM

## 2025-01-07 RX ORDER — TADALAFIL 5 MG/1
5 TABLET ORAL DAILY
Qty: 90 TABLET | Refills: 3 | Status: SHIPPED | OUTPATIENT
Start: 2025-01-07

## 2025-01-07 RX ORDER — TADALAFIL 5 MG/1
5 TABLET ORAL DAILY
Qty: 30 TABLET | Refills: 0 | Status: SHIPPED | OUTPATIENT
Start: 2025-01-07 | End: 2025-01-07

## 2025-05-27 ENCOUNTER — TELEPHONE (OUTPATIENT)
Dept: UROLOGY | Facility: CLINIC | Age: 65
End: 2025-05-27
Payer: COMMERCIAL

## 2025-05-27 NOTE — TELEPHONE ENCOUNTER
I called patient and patient's spouse, neither of them have voicemail set up.  If patient calls back, OK TO RELEASE HUB.  Patient needs to get PSA done prior to upcoming appt.

## 2025-06-04 ENCOUNTER — TELEPHONE (OUTPATIENT)
Dept: UROLOGY | Facility: CLINIC | Age: 65
End: 2025-06-04
Payer: COMMERCIAL

## 2025-06-04 NOTE — TELEPHONE ENCOUNTER
Patient is needing his lab order faxed to [phone]. He is there now waiting to get them done. Please have the provider to sign the order.

## 2025-06-04 NOTE — TELEPHONE ENCOUNTER
I faxed signed PSA order to lab @ [phone] per patient's request. I called patient and he answered but the call dropped. I called patient and it didn't ring and went to automated message.  If patient calls back OK TO RELEASE HUB. Orders should be at office and patient needs to reschedule appt on 6/6 because results won't be back  by Friday 6/6.

## 2025-06-06 ENCOUNTER — TELEPHONE (OUTPATIENT)
Dept: UROLOGY | Facility: CLINIC | Age: 65
End: 2025-06-06

## 2025-06-06 ENCOUNTER — OFFICE VISIT (OUTPATIENT)
Dept: UROLOGY | Facility: CLINIC | Age: 65
End: 2025-06-06
Payer: COMMERCIAL

## 2025-06-06 VITALS
HEIGHT: 67 IN | HEART RATE: 89 BPM | BODY MASS INDEX: 27.94 KG/M2 | TEMPERATURE: 97.8 F | WEIGHT: 178 LBS | SYSTOLIC BLOOD PRESSURE: 142 MMHG | DIASTOLIC BLOOD PRESSURE: 88 MMHG | RESPIRATION RATE: 18 BRPM | OXYGEN SATURATION: 99 %

## 2025-06-06 DIAGNOSIS — N32.81 OVERACTIVE BLADDER: ICD-10-CM

## 2025-06-06 DIAGNOSIS — R39.9 LOWER URINARY TRACT SYMPTOMS (LUTS): ICD-10-CM

## 2025-06-06 DIAGNOSIS — N52.35 ERECTILE DYSFUNCTION FOLLOWING RADIATION THERAPY: ICD-10-CM

## 2025-06-06 DIAGNOSIS — C61 PROSTATE CANCER: Primary | ICD-10-CM

## 2025-06-06 RX ORDER — LATANOPROST 50 UG/ML
SOLUTION/ DROPS OPHTHALMIC
COMMUNITY
Start: 2025-04-09

## 2025-06-06 NOTE — TELEPHONE ENCOUNTER
Paulina called [facility] in [place] for Verbal PSA, PSA was complete on 6/5/25 at 1.0 and the lab at [facility] will fax.
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
## 2025-06-13 ENCOUNTER — TRANSCRIBE ORDERS (OUTPATIENT)
Dept: LAB | Facility: HOSPITAL | Age: 65
End: 2025-06-13
Payer: COMMERCIAL

## 2025-06-13 ENCOUNTER — HOSPITAL ENCOUNTER (OUTPATIENT)
Facility: HOSPITAL | Age: 65
Discharge: HOME | End: 2025-06-13
Payer: COMMERCIAL

## 2025-06-13 DIAGNOSIS — M25.511 RIGHT SHOULDER PAIN, UNSPECIFIED CHRONICITY: ICD-10-CM

## 2025-06-13 DIAGNOSIS — M25.511 RIGHT SHOULDER PAIN, UNSPECIFIED CHRONICITY: Primary | ICD-10-CM

## 2025-06-13 PROCEDURE — 73030 X-RAY EXAM OF SHOULDER: CPT

## 2025-07-03 ENCOUNTER — TELEPHONE (OUTPATIENT)
Dept: CARDIOLOGY | Facility: CLINIC | Age: 65
End: 2025-07-03
Payer: COMMERCIAL

## 2025-07-11 ENCOUNTER — OFFICE VISIT (OUTPATIENT)
Dept: CARDIOLOGY | Facility: CLINIC | Age: 65
End: 2025-07-11
Payer: COMMERCIAL

## 2025-07-11 ENCOUNTER — TELEPHONE (OUTPATIENT)
Dept: CARDIOLOGY | Facility: CLINIC | Age: 65
End: 2025-07-11
Payer: COMMERCIAL

## 2025-07-11 VITALS
WEIGHT: 181.4 LBS | OXYGEN SATURATION: 98 % | HEIGHT: 67 IN | BODY MASS INDEX: 28.47 KG/M2 | SYSTOLIC BLOOD PRESSURE: 154 MMHG | HEART RATE: 52 BPM | DIASTOLIC BLOOD PRESSURE: 91 MMHG

## 2025-07-11 DIAGNOSIS — E78.5 DYSLIPIDEMIA: ICD-10-CM

## 2025-07-11 DIAGNOSIS — I10 ESSENTIAL HYPERTENSION: ICD-10-CM

## 2025-07-11 DIAGNOSIS — I25.10 ASCVD (ARTERIOSCLEROTIC CARDIOVASCULAR DISEASE): Primary | ICD-10-CM

## 2025-07-11 PROCEDURE — 3077F SYST BP >= 140 MM HG: CPT | Performed by: PHYSICIAN ASSISTANT

## 2025-07-11 PROCEDURE — 93000 ELECTROCARDIOGRAM COMPLETE: CPT | Performed by: PHYSICIAN ASSISTANT

## 2025-07-11 PROCEDURE — 99214 OFFICE O/P EST MOD 30 MIN: CPT | Performed by: PHYSICIAN ASSISTANT

## 2025-07-11 PROCEDURE — 3080F DIAST BP >= 90 MM HG: CPT | Performed by: PHYSICIAN ASSISTANT
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
## 2025-07-11 NOTE — TELEPHONE ENCOUNTER
Requested labs from PCP       ----- Message from Ilya Hartman sent at 7/11/2025  9:10 AM EDT -----  Can we get labs from pcp

## (undated) DEVICE — SPNG GZ WOVN 4X4IN 12PLY 10/BX STRL

## (undated) DEVICE — GLV SURG SENSICARE W/ALOE PF LF 7 STRL

## (undated) DEVICE — CVR PROB ULTRASND CIVFLEX GEN/PURP TELESCP/FOLD 5.5X58IN LF

## (undated) DEVICE — RICH CYSTO: Brand: MEDLINE INDUSTRIES, INC.

## (undated) DEVICE — SYR LUERLOK 20CC BX/50

## (undated) DEVICE — Device